# Patient Record
Sex: FEMALE | Race: WHITE | Employment: FULL TIME | URBAN - METROPOLITAN AREA
[De-identification: names, ages, dates, MRNs, and addresses within clinical notes are randomized per-mention and may not be internally consistent; named-entity substitution may affect disease eponyms.]

---

## 2023-04-07 ENCOUNTER — OFFICE VISIT (OUTPATIENT)
Dept: PRIMARY CARE | Facility: CLINIC | Age: 58
End: 2023-04-07
Payer: COMMERCIAL

## 2023-04-07 ENCOUNTER — LAB (OUTPATIENT)
Dept: LAB | Facility: LAB | Age: 58
End: 2023-04-07
Payer: COMMERCIAL

## 2023-04-07 VITALS
WEIGHT: 255 LBS | TEMPERATURE: 96.6 F | HEART RATE: 47 BPM | SYSTOLIC BLOOD PRESSURE: 126 MMHG | HEIGHT: 67 IN | BODY MASS INDEX: 40.02 KG/M2 | DIASTOLIC BLOOD PRESSURE: 74 MMHG | OXYGEN SATURATION: 98 %

## 2023-04-07 DIAGNOSIS — J45.40 MODERATE PERSISTENT REACTIVE AIRWAY DISEASE WITHOUT COMPLICATION (HHS-HCC): ICD-10-CM

## 2023-04-07 DIAGNOSIS — J30.81 ALLERGIC RHINITIS DUE TO ANIMAL HAIR AND DANDER: ICD-10-CM

## 2023-04-07 DIAGNOSIS — J45.990 EXERCISE-INDUCED ASTHMA (HHS-HCC): Primary | ICD-10-CM

## 2023-04-07 DIAGNOSIS — E66.01 MORBID OBESITY WITH BMI OF 40.0-44.9, ADULT (MULTI): ICD-10-CM

## 2023-04-07 DIAGNOSIS — J30.2 ACUTE SEASONAL ALLERGIC RHINITIS: ICD-10-CM

## 2023-04-07 PROBLEM — R29.898 WEAKNESS OF LEFT FOOT: Status: ACTIVE | Noted: 2023-04-07

## 2023-04-07 PROBLEM — M25.572 LEFT ANKLE PAIN: Status: ACTIVE | Noted: 2023-04-07

## 2023-04-07 PROBLEM — G47.30 SLEEP APNEA: Status: ACTIVE | Noted: 2023-04-07

## 2023-04-07 PROBLEM — R92.8 ABNORMAL MAMMOGRAM OF RIGHT BREAST: Status: ACTIVE | Noted: 2023-04-07

## 2023-04-07 PROBLEM — J45.909 REACTIVE AIRWAY DISEASE (HHS-HCC): Status: ACTIVE | Noted: 2023-04-07

## 2023-04-07 PROBLEM — F41.9 ANXIETY DISORDER: Status: ACTIVE | Noted: 2023-04-07

## 2023-04-07 PROBLEM — M25.672 STIFFNESS OF LEFT ANKLE, NOT ELSEWHERE CLASSIFIED: Status: ACTIVE | Noted: 2023-04-07

## 2023-04-07 PROBLEM — L30.9 ECZEMA: Status: ACTIVE | Noted: 2023-04-07

## 2023-04-07 LAB
AMPHETAMINE (PRESENCE) IN URINE BY SCREEN METHOD: NORMAL
BARBITURATES PRESENCE IN URINE BY SCREEN METHOD: NORMAL
BENZODIAZEPINE (PRESENCE) IN URINE BY SCREEN METHOD: NORMAL
CANNABINOIDS IN URINE BY SCREEN METHOD: NORMAL
COCAINE (PRESENCE) IN URINE BY SCREEN METHOD: NORMAL
DRUG SCREEN COMMENT URINE: NORMAL
FENTANYL URINE: NORMAL
METHADONE (PRESENCE) IN URINE BY SCREEN METHOD: NORMAL
OPIATES (PRESENCE) IN URINE BY SCREEN METHOD: NORMAL
OXYCODONE (PRESENCE) IN URINE BY SCREEN METHOD: NORMAL
PHENCYCLIDINE (PRESENCE) IN URINE BY SCREEN METHOD: NORMAL

## 2023-04-07 PROCEDURE — 1036F TOBACCO NON-USER: CPT | Performed by: FAMILY MEDICINE

## 2023-04-07 PROCEDURE — 96372 THER/PROPH/DIAG INJ SC/IM: CPT | Performed by: FAMILY MEDICINE

## 2023-04-07 PROCEDURE — 80307 DRUG TEST PRSMV CHEM ANLYZR: CPT

## 2023-04-07 PROCEDURE — 99213 OFFICE O/P EST LOW 20 MIN: CPT | Performed by: FAMILY MEDICINE

## 2023-04-07 PROCEDURE — 3008F BODY MASS INDEX DOCD: CPT | Performed by: FAMILY MEDICINE

## 2023-04-07 RX ORDER — PHENTERMINE AND TOPIRAMATE 7.5; 46 MG/1; MG/1
1 CAPSULE, EXTENDED RELEASE ORAL DAILY
COMMUNITY
Start: 2022-09-02 | End: 2023-05-04 | Stop reason: ALTCHOICE

## 2023-04-07 RX ORDER — PHENTERMINE HYDROCHLORIDE 37.5 MG/1
37.5 TABLET ORAL DAILY
Qty: 30 TABLET | Refills: 0 | Status: SHIPPED | OUTPATIENT
Start: 2023-04-07 | End: 2023-05-04 | Stop reason: SDUPTHER

## 2023-04-07 RX ORDER — CLOBETASOL PROPIONATE 0.5 MG/G
OINTMENT TOPICAL
COMMUNITY
Start: 2021-04-28 | End: 2024-02-28 | Stop reason: SDUPTHER

## 2023-04-07 RX ORDER — ALBUTEROL SULFATE 90 UG/1
2 AEROSOL, METERED RESPIRATORY (INHALATION)
COMMUNITY
Start: 2021-04-28

## 2023-04-07 RX ORDER — VENLAFAXINE HYDROCHLORIDE 37.5 MG/1
37.5 CAPSULE, EXTENDED RELEASE ORAL DAILY
COMMUNITY
Start: 2018-08-14 | End: 2023-08-29 | Stop reason: SDUPTHER

## 2023-04-07 RX ORDER — TRIAMCINOLONE ACETONIDE 40 MG/ML
60 INJECTION, SUSPENSION INTRA-ARTICULAR; INTRAMUSCULAR ONCE
Status: COMPLETED | OUTPATIENT
Start: 2023-04-07 | End: 2023-04-07

## 2023-04-07 RX ORDER — PHENTERMINE HYDROCHLORIDE 37.5 MG/1
1 TABLET ORAL DAILY
COMMUNITY
Start: 2022-08-08 | End: 2023-04-07 | Stop reason: SDUPTHER

## 2023-04-07 RX ORDER — MONTELUKAST SODIUM 10 MG/1
1 TABLET ORAL DAILY
COMMUNITY
Start: 2018-10-30 | End: 2023-05-04 | Stop reason: SDUPTHER

## 2023-04-07 RX ADMIN — TRIAMCINOLONE ACETONIDE 60 MG: 40 INJECTION, SUSPENSION INTRA-ARTICULAR; INTRAMUSCULAR at 10:18

## 2023-04-07 ASSESSMENT — PAIN SCALES - GENERAL: PAINLEVEL: 0-NO PAIN

## 2023-04-07 NOTE — PROGRESS NOTES
Subjective   Patient ID: Elsie Renteria is a 57 y.o. female.    Patient here for chronic rhinitis. Would like kenalog shot. Has tried all OTC with little relief. Also would like to get back on phentermine. Has worked well in past. Extremely physically active. No problems with stimulants.    OARRS:  Renee Velásquez MD on 4/7/2023 10:00 AM  I have personally reviewed the OARRS report for Elsie Renteria. I have considered the risks of abuse, dependence, addiction and diversion and I believe that it is clinically appropriate for Elsie Renteria to be prescribed this medication    Is the patient prescribed a combination of a benzodiazepine and opioid?  No    Last Urine Drug Screen / ordered today: Yes  Recent Results (from the past 32598 hour(s))   Drug Screen, Urine With Reflex to Confirmation    Collection Time: 04/07/23 10:27 AM   Result Value Ref Range    DRUG SCREEN COMMENT URINE SEE BELOW     Amphetamine Screen, Urine PRESUMPTIVE NEGATIVE NEGATIVE    Barbiturate Screen, Urine PRESUMPTIVE NEGATIVE NEGATIVE    BENZODIAZEPINE (PRESENCE) IN URINE BY SCREEN METHOD PRESUMPTIVE NEGATIVE NEGATIVE    Cannabinoid Screen, Urine PRESUMPTIVE NEGATIVE NEGATIVE    Cocaine Screen, Urine PRESUMPTIVE NEGATIVE NEGATIVE    Fentanyl, Ur PRESUMPTIVE NEGATIVE NEGATIVE    Methadone Screen, Urine PRESUMPTIVE NEGATIVE NEGATIVE    Opiate Screen, Urine PRESUMPTIVE NEGATIVE NEGATIVE    Oxycodone Screen, Ur PRESUMPTIVE NEGATIVE NEGATIVE    PCP Screen, Urine PRESUMPTIVE NEGATIVE NEGATIVE   OPIATE/OPIOID/BENZO PRESCRIPTION COMPLIANCE    Collection Time: 04/28/22  9:15 AM   Result Value Ref Range    DRUG SCREEN COMMENT URINE SEE BELOW     Creatine, Urine 179.0 mg/dL    Amphetamine Screen, Urine PRESUMPTIVE NEGATIVE NEGATIVE    Barbiturate Screen, Urine PRESUMPTIVE NEGATIVE NEGATIVE    Cannabinoid Screen, Urine PRESUMPTIVE NEGATIVE NEGATIVE    Cocaine Screen, Urine PRESUMPTIVE NEGATIVE NEGATIVE    PCP Screen, Urine PRESUMPTIVE NEGATIVE NEGATIVE     7-Aminoclonazepam <25 Cutoff <25 ng/mL    Alpha-Hydroxyalprazolam <25 Cutoff <25 ng/mL    Alpha-Hydroxymidazolam <25 Cutoff <25 ng/mL    Alprazolam <25 Cutoff <25 ng/mL    Chlordiazepoxide <25 Cutoff <25 ng/mL    Clonazepam <25 Cutoff <25 ng/mL    Diazepam <25 Cutoff <25 ng/mL    Lorazepam <25 Cutoff <25 ng/mL    Midazolam <25 Cutoff <25 ng/mL    Nordiazepam <25 Cutoff <25 ng/mL    Oxazepam <25 Cutoff <25 ng/mL    Temazepam <25 Cutoff <25 ng/mL    Zolpidem <25 Cutoff <25 ng/mL    Zolpidem Metabolite (ZCA) <25 Cutoff <25 ng/mL    6-Acetylmorphine <25 Cutoff <25 ng/mL    Codeine <50 Cutoff <50 ng/mL    Hydrocodone <25 Cutoff <25 ng/mL    Hydromorphone <25 Cutoff <25 ng/mL    Morphine Urine <50 Cutoff <50 ng/mL    Norhydrocodone <25 Cutoff <25 ng/mL    Noroxycodone <25 Cutoff <25 ng/mL    Oxycodone <25 Cutoff <25 ng/mL    Oxymorphone <25 Cutoff <25 ng/mL    Tramadol <50 Cutoff <50 ng/mL    O-Desmethyltramadol <50 Cutoff <50 ng/mL    Fentanyl <2.5 Cutoff<2.5 ng/mL    Norfentanyl <2.5 Cutoff<2.5 ng/mL    METHADONE CONFIRMATION,URINE <25 Cutoff <25 ng/mL    EDDP <25 Cutoff <25 ng/mL     Results are as expected.     Controlled Substance Agreement:  Date of the Last Agreement: today  Reviewed Controlled Substance Agreement including but not limited to the benefits, risks, and alternatives to treatment with a Controlled Substance medication(s).    Anorexiants:   What is the patient's goal of therapy? Lose weight  Is this being achieved with current treatment? yes    I have assessed the patient's continuing efforts to lose weight., I have assessed the patient's dedication to the treatment program and the response to treatment., and I have assessed the presence or absence of contraindications, adverse effects, and indicators of possible substance abuse that would necessitate cessation of treatment utilizing controlled substance.    Patient has demonstrated continued efforts to lose weight, is dedicated to the treatment program  and the response to treatment. and I have assessed for the presence or absence of contraindications, adverse effects, and indicators of possible substance abuse that would necessitate cessation of treatment utilizing controlled substance.    Activities of Daily Living:   Is your overall impression that this patient is benefiting (symptom reduction outweighs side effects) from anorexiants therapy? Yes     1. Physical Functioning: Better  2. Family Relationship: Same  3. Social Relationship: Same  4. Mood: Same  5. Sleep Patterns: Same  6. Overall Function: Same      Review of Systems   Constitutional:  Negative for fatigue, fever and unexpected weight change.   HENT:  Positive for congestion and rhinorrhea. Negative for ear pain, hearing loss, sore throat and trouble swallowing.    Eyes:  Negative for pain and visual disturbance.   Respiratory:  Negative for cough and shortness of breath.    Cardiovascular:  Negative for chest pain, palpitations and leg swelling.   Gastrointestinal:  Negative for abdominal pain, blood in stool, diarrhea, nausea and vomiting.   Genitourinary:  Negative for dysuria, frequency, hematuria and urgency.   Musculoskeletal:  Negative for joint swelling.   Skin:  Negative for pallor and rash.   Neurological:  Negative for dizziness, syncope, weakness, numbness and headaches.   Psychiatric/Behavioral:  Negative for confusion, decreased concentration, hallucinations and suicidal ideas.      Vitals:    04/07/23 0920   BP: 126/74   Pulse: (!) 47   Temp: 35.9 °C (96.6 °F)   SpO2: 98%      Objective   Physical Exam  Constitutional:       Appearance: Normal appearance.   HENT:      Nose: Congestion and rhinorrhea present.      Mouth/Throat:      Mouth: Mucous membranes are moist.      Pharynx: Posterior oropharyngeal erythema present.   Cardiovascular:      Rate and Rhythm: Normal rate and regular rhythm.      Heart sounds: Normal heart sounds.   Pulmonary:      Effort: Pulmonary effort is normal.       Breath sounds: Normal breath sounds.   Musculoskeletal:      Cervical back: Neck supple.   Skin:     General: Skin is warm and dry.   Neurological:      General: No focal deficit present.      Mental Status: She is alert.   Psychiatric:         Mood and Affect: Mood normal.         Speech: Speech normal.         Behavior: Behavior normal.         Cognition and Memory: Cognition normal.         Assessment/Plan   Diagnoses and all orders for this visit:  Exercise-induced asthma  Moderate persistent reactive airway disease without complication  Morbid obesity with BMI of 40.0-44.9, adult (CMS/[facility])  -     Drug Screen, Urine With Reflex to Confirmation; Future  -     phentermine (Adipex-P) 37.5 mg tablet; Take 1 tablet (37.5 mg) by mouth once daily.  Acute seasonal allergic rhinitis  -     triamcinolone acetonide (Kenalog-40) injection 64 mg  Allergic rhinitis due to animal hair and dander

## 2023-04-12 PROBLEM — J30.81 ALLERGIC RHINITIS DUE TO ANIMAL HAIR AND DANDER: Status: ACTIVE | Noted: 2017-07-14

## 2023-04-22 ASSESSMENT — ENCOUNTER SYMPTOMS
PALPITATIONS: 0
WEAKNESS: 0
BLOOD IN STOOL: 0
RHINORRHEA: 1
DYSURIA: 0
HALLUCINATIONS: 0
NAUSEA: 0
JOINT SWELLING: 0
UNEXPECTED WEIGHT CHANGE: 0
EYE PAIN: 0
DIARRHEA: 0
FEVER: 0
ABDOMINAL PAIN: 0
HEADACHES: 0
DIZZINESS: 0
FATIGUE: 0
DECREASED CONCENTRATION: 0
SHORTNESS OF BREATH: 0
CONFUSION: 0
TROUBLE SWALLOWING: 0
HEMATURIA: 0
COUGH: 0
FREQUENCY: 0
NUMBNESS: 0
SORE THROAT: 0
VOMITING: 0

## 2023-04-22 NOTE — PATIENT INSTRUCTIONS
It was nice to see you today!  Will give kenalog 60mg daily  Phentermine restarted, follow up 1 month  Discussed current concerns and addressed   Reviewed recent labs and diagnostics  Reviewed medications list  Continue to eat a healthy diet, exercise at least 3 times a week or more  Plan and follow up discussed

## 2023-05-04 ENCOUNTER — OFFICE VISIT (OUTPATIENT)
Dept: PRIMARY CARE | Facility: CLINIC | Age: 58
End: 2023-05-04
Payer: COMMERCIAL

## 2023-05-04 VITALS
HEART RATE: 79 BPM | BODY MASS INDEX: 38.45 KG/M2 | DIASTOLIC BLOOD PRESSURE: 76 MMHG | OXYGEN SATURATION: 99 % | HEIGHT: 67 IN | WEIGHT: 245 LBS | TEMPERATURE: 96.3 F | SYSTOLIC BLOOD PRESSURE: 126 MMHG

## 2023-05-04 DIAGNOSIS — E66.1 CLASS 2 DRUG-INDUCED OBESITY WITHOUT SERIOUS COMORBIDITY WITH BODY MASS INDEX (BMI) OF 38.0 TO 38.9 IN ADULT: ICD-10-CM

## 2023-05-04 DIAGNOSIS — E66.01 MORBID OBESITY WITH BMI OF 40.0-44.9, ADULT (MULTI): ICD-10-CM

## 2023-05-04 DIAGNOSIS — J45.990 EXERCISE-INDUCED ASTHMA (HHS-HCC): Primary | ICD-10-CM

## 2023-05-04 PROCEDURE — 1036F TOBACCO NON-USER: CPT | Performed by: FAMILY MEDICINE

## 2023-05-04 PROCEDURE — 99213 OFFICE O/P EST LOW 20 MIN: CPT | Performed by: FAMILY MEDICINE

## 2023-05-04 PROCEDURE — 3008F BODY MASS INDEX DOCD: CPT | Performed by: FAMILY MEDICINE

## 2023-05-04 RX ORDER — MONTELUKAST SODIUM 10 MG/1
10 TABLET ORAL DAILY
Qty: 90 TABLET | Refills: 3 | Status: SHIPPED | OUTPATIENT
Start: 2023-05-04 | End: 2023-05-24 | Stop reason: SDUPTHER

## 2023-05-04 RX ORDER — PHENTERMINE HYDROCHLORIDE 37.5 MG/1
37.5 TABLET ORAL DAILY
Qty: 30 TABLET | Refills: 0 | Status: SHIPPED | OUTPATIENT
Start: 2023-05-04 | End: 2023-06-01 | Stop reason: SDUPTHER

## 2023-05-04 ASSESSMENT — ENCOUNTER SYMPTOMS
NAUSEA: 0
ABDOMINAL PAIN: 0
SORE THROAT: 0
NUMBNESS: 0
HALLUCINATIONS: 0
TROUBLE SWALLOWING: 0
DIARRHEA: 0
DIZZINESS: 0
FATIGUE: 0
SHORTNESS OF BREATH: 0
UNEXPECTED WEIGHT CHANGE: 0
HEMATURIA: 0
FEVER: 0
PALPITATIONS: 0
DYSURIA: 0
HEADACHES: 0
WEAKNESS: 0
VOMITING: 0
COUGH: 0
BLOOD IN STOOL: 0
FREQUENCY: 0
DECREASED CONCENTRATION: 0
EYE PAIN: 0
JOINT SWELLING: 0
CONFUSION: 0

## 2023-05-04 ASSESSMENT — PAIN SCALES - GENERAL: PAINLEVEL: 0-NO PAIN

## 2023-05-04 NOTE — PROGRESS NOTES
OARRS:  No data recorded  I have personally reviewed the OARRS report for Elsie Renteria. I have considered the risks of abuse, dependence, addiction and diversion and I believe that it is clinically appropriate for Elsie Renteria to be prescribed this medication    Is the patient prescribed a combination of a benzodiazepine and opioid?  No    Last Urine Drug Screen / ordered today: Yes  Recent Results (from the past 45079 hour(s))   Drug Screen, Urine With Reflex to Confirmation    Collection Time: 04/07/23 10:27 AM   Result Value Ref Range    DRUG SCREEN COMMENT URINE SEE BELOW     Amphetamine Screen, Urine PRESUMPTIVE NEGATIVE NEGATIVE    Barbiturate Screen, Urine PRESUMPTIVE NEGATIVE NEGATIVE    BENZODIAZEPINE (PRESENCE) IN URINE BY SCREEN METHOD PRESUMPTIVE NEGATIVE NEGATIVE    Cannabinoid Screen, Urine PRESUMPTIVE NEGATIVE NEGATIVE    Cocaine Screen, Urine PRESUMPTIVE NEGATIVE NEGATIVE    Fentanyl, Ur PRESUMPTIVE NEGATIVE NEGATIVE    Methadone Screen, Urine PRESUMPTIVE NEGATIVE NEGATIVE    Opiate Screen, Urine PRESUMPTIVE NEGATIVE NEGATIVE    Oxycodone Screen, Ur PRESUMPTIVE NEGATIVE NEGATIVE    PCP Screen, Urine PRESUMPTIVE NEGATIVE NEGATIVE   OPIATE/OPIOID/BENZO PRESCRIPTION COMPLIANCE    Collection Time: 04/28/22  9:15 AM   Result Value Ref Range    DRUG SCREEN COMMENT URINE SEE BELOW     Creatine, Urine 179.0 mg/dL    Amphetamine Screen, Urine PRESUMPTIVE NEGATIVE NEGATIVE    Barbiturate Screen, Urine PRESUMPTIVE NEGATIVE NEGATIVE    Cannabinoid Screen, Urine PRESUMPTIVE NEGATIVE NEGATIVE    Cocaine Screen, Urine PRESUMPTIVE NEGATIVE NEGATIVE    PCP Screen, Urine PRESUMPTIVE NEGATIVE NEGATIVE    7-Aminoclonazepam <25 Cutoff <25 ng/mL    Alpha-Hydroxyalprazolam <25 Cutoff <25 ng/mL    Alpha-Hydroxymidazolam <25 Cutoff <25 ng/mL    Alprazolam <25 Cutoff <25 ng/mL    Chlordiazepoxide <25 Cutoff <25 ng/mL    Clonazepam <25 Cutoff <25 ng/mL    Diazepam <25 Cutoff <25 ng/mL    Lorazepam <25 Cutoff <25 ng/mL    Midazolam  <25 Cutoff <25 ng/mL    Nordiazepam <25 Cutoff <25 ng/mL    Oxazepam <25 Cutoff <25 ng/mL    Temazepam <25 Cutoff <25 ng/mL    Zolpidem <25 Cutoff <25 ng/mL    Zolpidem Metabolite (ZCA) <25 Cutoff <25 ng/mL    6-Acetylmorphine <25 Cutoff <25 ng/mL    Codeine <50 Cutoff <50 ng/mL    Hydrocodone <25 Cutoff <25 ng/mL    Hydromorphone <25 Cutoff <25 ng/mL    Morphine Urine <50 Cutoff <50 ng/mL    Norhydrocodone <25 Cutoff <25 ng/mL    Noroxycodone <25 Cutoff <25 ng/mL    Oxycodone <25 Cutoff <25 ng/mL    Oxymorphone <25 Cutoff <25 ng/mL    Tramadol <50 Cutoff <50 ng/mL    O-Desmethyltramadol <50 Cutoff <50 ng/mL    Fentanyl <2.5 Cutoff<2.5 ng/mL    Norfentanyl <2.5 Cutoff<2.5 ng/mL    METHADONE CONFIRMATION,URINE <25 Cutoff <25 ng/mL    EDDP <25 Cutoff <25 ng/mL     Results are as expected.     Controlled Substance Agreement:  Date of the Last Agreement: current  Reviewed Controlled Substance Agreement including but not limited to the benefits, risks, and alternatives to treatment with a Controlled Substance medication(s).    Anorexiants:   What is the patient's goal of therapy? Lose weight  Is this being achieved with current treatment? yes    I have assessed the patient's continuing efforts to lose weight., I have assessed the patient's dedication to the treatment program and the response to treatment., and I have assessed the presence or absence of contraindications, adverse effects, and indicators of possible substance abuse that would necessitate cessation of treatment utilizing controlled substance.    Patient has demonstrated continued efforts to lose weight, is dedicated to the treatment program and the response to treatment. and I have assessed for the presence or absence of contraindications, adverse effects, and indicators of possible substance abuse that would necessitate cessation of treatment utilizing controlled substance.    Activities of Daily Living:   Is your overall impression that this patient is  benefiting (symptom reduction outweighs side effects) from anorexiants therapy? Yes     1. Physical Functioning: Same  2. Family Relationship: Same  3. Social Relationship: Same  4. Mood: Same  5. Sleep Patterns: Same  6. Overall Function: Same  Subjective   Patient ID: Elsie Renteria is a 57 y.o. female.    RF phentermine. Lost 11 lbs.        Review of Systems   Constitutional:  Negative for fatigue, fever and unexpected weight change.   HENT:  Negative for congestion, ear pain, hearing loss, sore throat and trouble swallowing.    Eyes:  Negative for pain and visual disturbance.   Respiratory:  Negative for cough and shortness of breath.    Cardiovascular:  Negative for chest pain, palpitations and leg swelling.   Gastrointestinal:  Negative for abdominal pain, blood in stool, diarrhea, nausea and vomiting.   Genitourinary:  Negative for dysuria, frequency, hematuria and urgency.   Musculoskeletal:  Negative for joint swelling.   Skin:  Negative for pallor and rash.   Neurological:  Negative for dizziness, syncope, weakness, numbness and headaches.   Psychiatric/Behavioral:  Negative for confusion, decreased concentration, hallucinations and suicidal ideas.      Vitals:    05/04/23 0923   BP: 126/76   Pulse: 79   Temp: 35.7 °C (96.3 °F)   SpO2: 99%      Objective   Physical Exam  Constitutional:       Appearance: Normal appearance. She is obese.   Cardiovascular:      Rate and Rhythm: Normal rate and regular rhythm.      Heart sounds: Normal heart sounds.   Pulmonary:      Effort: Pulmonary effort is normal.      Breath sounds: Normal breath sounds.   Musculoskeletal:      Cervical back: Neck supple.   Skin:     General: Skin is warm and dry.   Neurological:      General: No focal deficit present.      Mental Status: She is alert.   Psychiatric:         Mood and Affect: Mood normal.         Speech: Speech normal.         Behavior: Behavior normal.         Cognition and Memory: Cognition normal.         Assessment/Plan    Diagnoses and all orders for this visit:  Exercise-induced asthma  -     montelukast (Singulair) 10 mg tablet; Take 1 tablet (10 mg) by mouth once daily.  Morbid obesity with BMI of 40.0-44.9, adult (CMS/[facility])  -     phentermine (Adipex-P) 37.5 mg tablet; Take 1 tablet (37.5 mg) by mouth once daily.  Class 2 drug-induced obesity without serious comorbidity with body mass index (BMI) of 38.0 to 38.9 in adult

## 2023-05-04 NOTE — PATIENT INSTRUCTIONS
RF phentermine  Risk and benefit discussed  OARRS reviewed  CSA and UDS current/updated  Follow up 1 month

## 2023-05-05 ENCOUNTER — APPOINTMENT (OUTPATIENT)
Dept: PRIMARY CARE | Facility: CLINIC | Age: 58
End: 2023-05-05
Payer: COMMERCIAL

## 2023-05-24 DIAGNOSIS — J45.990 EXERCISE-INDUCED ASTHMA (HHS-HCC): ICD-10-CM

## 2023-05-30 RX ORDER — MONTELUKAST SODIUM 10 MG/1
10 TABLET ORAL DAILY
Qty: 90 TABLET | Refills: 3 | Status: SHIPPED | OUTPATIENT
Start: 2023-05-30 | End: 2024-05-02

## 2023-06-01 ENCOUNTER — TELEMEDICINE (OUTPATIENT)
Dept: PRIMARY CARE | Facility: CLINIC | Age: 58
End: 2023-06-01
Payer: COMMERCIAL

## 2023-06-01 DIAGNOSIS — E66.01 MORBID OBESITY WITH BMI OF 40.0-44.9, ADULT (MULTI): ICD-10-CM

## 2023-06-01 PROCEDURE — 99213 OFFICE O/P EST LOW 20 MIN: CPT | Performed by: FAMILY MEDICINE

## 2023-06-01 RX ORDER — PHENTERMINE HYDROCHLORIDE 37.5 MG/1
37.5 TABLET ORAL DAILY
Qty: 30 TABLET | Refills: 0 | Status: SHIPPED | OUTPATIENT
Start: 2023-06-05 | End: 2023-06-13

## 2023-06-01 ASSESSMENT — ENCOUNTER SYMPTOMS
PALPITATIONS: 0
HEMATURIA: 0
NUMBNESS: 0
WEAKNESS: 0
DECREASED CONCENTRATION: 0
FATIGUE: 0
TROUBLE SWALLOWING: 0
JOINT SWELLING: 0
FEVER: 0
DIZZINESS: 0
SORE THROAT: 0
EYE PAIN: 0
CONFUSION: 0
DYSURIA: 0
HALLUCINATIONS: 0
DIARRHEA: 0
HEADACHES: 0
VOMITING: 0
SHORTNESS OF BREATH: 0
BLOOD IN STOOL: 0
ABDOMINAL PAIN: 0
NAUSEA: 0
COUGH: 0
UNEXPECTED WEIGHT CHANGE: 0
FREQUENCY: 0

## 2023-06-01 NOTE — PROGRESS NOTES
Subjective   Patient ID: Elsie Renteria is a 57 y.o. female.    Patient here to refill phentermine. She has lost weight. About 5 lbs. She is out of town to do a long cycle event.  Understands the medication, risk and benefits. No uncontrolled BP. No hx stroke, MI. Is following a healthy diet with a caloric goal below 1200 daily. She is committed to exercising at least 3x a week for at least 40 minutes. She does not smoke. She has no CP, SOB or palpitations.          Review of Systems   Constitutional:  Negative for fatigue, fever and unexpected weight change.   HENT:  Negative for congestion, ear pain, hearing loss, sore throat and trouble swallowing.    Eyes:  Negative for pain and visual disturbance.   Respiratory:  Negative for cough and shortness of breath.    Cardiovascular:  Negative for chest pain, palpitations and leg swelling.   Gastrointestinal:  Negative for abdominal pain, blood in stool, diarrhea, nausea and vomiting.   Genitourinary:  Negative for dysuria, frequency, hematuria and urgency.   Musculoskeletal:  Negative for joint swelling.   Skin:  Negative for pallor and rash.   Neurological:  Negative for dizziness, syncope, weakness, numbness and headaches.   Psychiatric/Behavioral:  Negative for confusion, decreased concentration, hallucinations and suicidal ideas.      There were no vitals filed for this visit.   Objective   Physical Exam  Constitutional:       Appearance: Normal appearance. She is obese.   Cardiovascular:      Rate and Rhythm: Normal rate and regular rhythm.      Heart sounds: Normal heart sounds.   Pulmonary:      Effort: Pulmonary effort is normal.      Breath sounds: Normal breath sounds.   Musculoskeletal:      Cervical back: Neck supple.   Skin:     General: Skin is warm and dry.   Neurological:      General: No focal deficit present.      Mental Status: She is alert.   Psychiatric:         Mood and Affect: Mood normal.         Speech: Speech normal.         Behavior: Behavior  normal.         Cognition and Memory: Cognition normal.         Assessment/Plan   Diagnoses and all orders for this visit:  Morbid obesity with BMI of 40.0-44.9, adult (CMS/[facility])  -     phentermine (Adipex-P) 37.5 mg tablet; Take 1 tablet (37.5 mg) by mouth once daily. Do not start before June 5, 2023.

## 2023-06-06 DIAGNOSIS — E66.01 MORBID OBESITY WITH BMI OF 40.0-44.9, ADULT (MULTI): ICD-10-CM

## 2023-06-06 NOTE — TELEPHONE ENCOUNTER
Patient was seen for virtual visit and phentermine was sent to pharmacy in [place] but they did not have the medication in stock and told the patient that they were not sure that they could get it in stock. Patient found another pharmacy which was added to her chart.

## 2023-06-07 RX ORDER — PHENTERMINE HYDROCHLORIDE 37.5 MG/1
37.5 TABLET ORAL DAILY
Qty: 30 TABLET | Refills: 0 | Status: CANCELLED | OUTPATIENT
Start: 2023-06-07

## 2023-06-09 NOTE — TELEPHONE ENCOUNTER
Patient called patient and would like the phentermine to be [facility] to [facility] on [address]. Patient is home from traveling

## 2023-06-11 DIAGNOSIS — E66.01 MORBID OBESITY WITH BMI OF 40.0-44.9, ADULT (MULTI): ICD-10-CM

## 2023-06-13 RX ORDER — PHENTERMINE HYDROCHLORIDE 37.5 MG/1
TABLET ORAL
Qty: 30 TABLET | Refills: 0 | Status: SHIPPED | OUTPATIENT
Start: 2023-06-13 | End: 2023-07-18 | Stop reason: SDUPTHER

## 2023-07-18 ENCOUNTER — OFFICE VISIT (OUTPATIENT)
Dept: PRIMARY CARE | Facility: CLINIC | Age: 58
End: 2023-07-18
Payer: COMMERCIAL

## 2023-07-18 VITALS
TEMPERATURE: 97.3 F | HEIGHT: 67 IN | SYSTOLIC BLOOD PRESSURE: 120 MMHG | BODY MASS INDEX: 37.51 KG/M2 | DIASTOLIC BLOOD PRESSURE: 70 MMHG | OXYGEN SATURATION: 100 % | HEART RATE: 60 BPM | WEIGHT: 239 LBS

## 2023-07-18 PROCEDURE — 99213 OFFICE O/P EST LOW 20 MIN: CPT | Performed by: FAMILY MEDICINE

## 2023-07-18 PROCEDURE — 3008F BODY MASS INDEX DOCD: CPT | Performed by: FAMILY MEDICINE

## 2023-07-18 PROCEDURE — 1036F TOBACCO NON-USER: CPT | Performed by: FAMILY MEDICINE

## 2023-07-18 RX ORDER — PHENTERMINE HYDROCHLORIDE 37.5 MG/1
37.5 TABLET ORAL DAILY
Qty: 30 TABLET | Refills: 0 | Status: SHIPPED | OUTPATIENT
Start: 2023-07-18 | End: 2023-08-21 | Stop reason: SDUPTHER

## 2023-07-18 ASSESSMENT — PAIN SCALES - GENERAL: PAINLEVEL: 0-NO PAIN

## 2023-07-18 NOTE — PROGRESS NOTES
Subjective   Patient ID: Elsie Renteria is a 57 y.o. female.    Patient here to refill phentermine. She has lost weight. Understands the medication, risk and benefits. No uncontrolled BP. No hx stroke, MI. Is following a healthy diet with a caloric goal below 1200 daily. She is committed to exercising at least 3x a week for at least 40 minutes. She does not smoke. She has no CP, SOB or palpitations.          Review of Systems   Constitutional:  Negative for fatigue, fever and unexpected weight change.   HENT:  Negative for congestion, ear pain, hearing loss, sore throat and trouble swallowing.    Eyes:  Negative for pain and visual disturbance.   Respiratory:  Negative for cough and shortness of breath.    Cardiovascular:  Negative for chest pain, palpitations and leg swelling.   Gastrointestinal:  Negative for abdominal pain, blood in stool, diarrhea, nausea and vomiting.   Genitourinary:  Negative for dysuria, frequency, hematuria and urgency.   Musculoskeletal:  Negative for joint swelling.   Skin:  Negative for pallor and rash.   Neurological:  Negative for dizziness, syncope, weakness, numbness and headaches.   Psychiatric/Behavioral:  Negative for confusion, decreased concentration, hallucinations and suicidal ideas.      Vitals:    07/18/23 1558   BP: 120/70   Pulse: 60   Temp: 36.3 °C (97.3 °F)   SpO2: 100%      Objective   Physical Exam  Constitutional:       Appearance: Normal appearance. She is obese.   Cardiovascular:      Rate and Rhythm: Normal rate and regular rhythm.      Heart sounds: Normal heart sounds.   Pulmonary:      Effort: Pulmonary effort is normal.      Breath sounds: Normal breath sounds.   Musculoskeletal:      Cervical back: Neck supple.   Skin:     General: Skin is warm and dry.   Neurological:      General: No focal deficit present.      Mental Status: She is alert.   Psychiatric:         Mood and Affect: Mood normal.         Speech: Speech normal.         Behavior: Behavior normal.          Cognition and Memory: Cognition normal.     OARRS:  No data recorded  I have personally reviewed the OARRS report for Elsie Renteria. I have considered the risks of abuse, dependence, addiction and diversion and I believe that it is clinically appropriate for Elsie Renteria to be prescribed this medication    Is the patient prescribed a combination of a benzodiazepine and opioid?  No    Last Urine Drug Screen / ordered today: Yes  Recent Results (from the past 86471 hour(s))   Drug Screen, Urine With Reflex to Confirmation    Collection Time: 04/07/23 10:27 AM   Result Value Ref Range    DRUG SCREEN COMMENT URINE SEE BELOW     Amphetamine Screen, Urine PRESUMPTIVE NEGATIVE NEGATIVE    Barbiturate Screen, Urine PRESUMPTIVE NEGATIVE NEGATIVE    BENZODIAZEPINE (PRESENCE) IN URINE BY SCREEN METHOD PRESUMPTIVE NEGATIVE NEGATIVE    Cannabinoid Screen, Urine PRESUMPTIVE NEGATIVE NEGATIVE    Cocaine Screen, Urine PRESUMPTIVE NEGATIVE NEGATIVE    Fentanyl, Ur PRESUMPTIVE NEGATIVE NEGATIVE    Methadone Screen, Urine PRESUMPTIVE NEGATIVE NEGATIVE    Opiate Screen, Urine PRESUMPTIVE NEGATIVE NEGATIVE    Oxycodone Screen, Ur PRESUMPTIVE NEGATIVE NEGATIVE    PCP Screen, Urine PRESUMPTIVE NEGATIVE NEGATIVE   OPIATE/OPIOID/BENZO PRESCRIPTION COMPLIANCE    Collection Time: 04/28/22  9:15 AM   Result Value Ref Range    DRUG SCREEN COMMENT URINE SEE BELOW     Creatine, Urine 179.0 mg/dL    Amphetamine Screen, Urine PRESUMPTIVE NEGATIVE NEGATIVE    Barbiturate Screen, Urine PRESUMPTIVE NEGATIVE NEGATIVE    Cannabinoid Screen, Urine PRESUMPTIVE NEGATIVE NEGATIVE    Cocaine Screen, Urine PRESUMPTIVE NEGATIVE NEGATIVE    PCP Screen, Urine PRESUMPTIVE NEGATIVE NEGATIVE    7-Aminoclonazepam <25 Cutoff <25 ng/mL    Alpha-Hydroxyalprazolam <25 Cutoff <25 ng/mL    Alpha-Hydroxymidazolam <25 Cutoff <25 ng/mL    Alprazolam <25 Cutoff <25 ng/mL    Chlordiazepoxide <25 Cutoff <25 ng/mL    Clonazepam <25 Cutoff <25 ng/mL    Diazepam <25 Cutoff <25 ng/mL     Lorazepam <25 Cutoff <25 ng/mL    Midazolam <25 Cutoff <25 ng/mL    Nordiazepam <25 Cutoff <25 ng/mL    Oxazepam <25 Cutoff <25 ng/mL    Temazepam <25 Cutoff <25 ng/mL    Zolpidem <25 Cutoff <25 ng/mL    Zolpidem Metabolite (ZCA) <25 Cutoff <25 ng/mL    6-Acetylmorphine <25 Cutoff <25 ng/mL    Codeine <50 Cutoff <50 ng/mL    Hydrocodone <25 Cutoff <25 ng/mL    Hydromorphone <25 Cutoff <25 ng/mL    Morphine Urine <50 Cutoff <50 ng/mL    Norhydrocodone <25 Cutoff <25 ng/mL    Noroxycodone <25 Cutoff <25 ng/mL    Oxycodone <25 Cutoff <25 ng/mL    Oxymorphone <25 Cutoff <25 ng/mL    Tramadol <50 Cutoff <50 ng/mL    O-Desmethyltramadol <50 Cutoff <50 ng/mL    Fentanyl <2.5 Cutoff<2.5 ng/mL    Norfentanyl <2.5 Cutoff<2.5 ng/mL    METHADONE CONFIRMATION,URINE <25 Cutoff <25 ng/mL    EDDP <25 Cutoff <25 ng/mL     Results are as expected.     Controlled Substance Agreement:  Date of the Last Agreement: 4/7/23  Reviewed Controlled Substance Agreement including but not limited to the benefits, risks, and alternatives to treatment with a Controlled Substance medication(s).    Anorexiants:   What is the patient's goal of therapy? Lose weight  Is this being achieved with current treatment? yes    I have assessed the patient's continuing efforts to lose weight., I have assessed the patient's dedication to the treatment program and the response to treatment., and I have assessed the presence or absence of contraindications, adverse effects, and indicators of possible substance abuse that would necessitate cessation of treatment utilizing controlled substance.    Patient has demonstrated continued efforts to lose weight, is dedicated to the treatment program and the response to treatment. and I have assessed for the presence or absence of contraindications, adverse effects, and indicators of possible substance abuse that would necessitate cessation of treatment utilizing controlled substance.    Activities of Daily Living:   Is  your overall impression that this patient is benefiting (symptom reduction outweighs side effects) from anorexiants therapy? Yes     1. Physical Functioning: Better  2. Family Relationship: Same  3. Social Relationship: Same  4. Mood: Same  5. Sleep Patterns: Same  6. Overall Function: Better      Assessment/Plan   There are no diagnoses linked to this encounter.

## 2023-08-02 ASSESSMENT — ENCOUNTER SYMPTOMS
DYSURIA: 0
CONFUSION: 0
DIZZINESS: 0
UNEXPECTED WEIGHT CHANGE: 0
ABDOMINAL PAIN: 0
HALLUCINATIONS: 0
WEAKNESS: 0
SORE THROAT: 0
NUMBNESS: 0
JOINT SWELLING: 0
DIARRHEA: 0
EYE PAIN: 0
BLOOD IN STOOL: 0
PALPITATIONS: 0
HEADACHES: 0
DECREASED CONCENTRATION: 0
HEMATURIA: 0
COUGH: 0
FATIGUE: 0
VOMITING: 0
NAUSEA: 0
FREQUENCY: 0
FEVER: 0
SHORTNESS OF BREATH: 0
TROUBLE SWALLOWING: 0

## 2023-08-21 ENCOUNTER — OFFICE VISIT (OUTPATIENT)
Dept: PRIMARY CARE | Facility: CLINIC | Age: 58
End: 2023-08-21
Payer: COMMERCIAL

## 2023-08-21 VITALS
OXYGEN SATURATION: 98 % | DIASTOLIC BLOOD PRESSURE: 84 MMHG | SYSTOLIC BLOOD PRESSURE: 126 MMHG | TEMPERATURE: 97.3 F | HEIGHT: 67 IN | BODY MASS INDEX: 37.2 KG/M2 | WEIGHT: 237 LBS | HEART RATE: 55 BPM

## 2023-08-21 PROCEDURE — 99213 OFFICE O/P EST LOW 20 MIN: CPT | Performed by: FAMILY MEDICINE

## 2023-08-21 PROCEDURE — 3008F BODY MASS INDEX DOCD: CPT | Performed by: FAMILY MEDICINE

## 2023-08-21 PROCEDURE — 1036F TOBACCO NON-USER: CPT | Performed by: FAMILY MEDICINE

## 2023-08-21 RX ORDER — PHENTERMINE HYDROCHLORIDE 37.5 MG/1
37.5 TABLET ORAL DAILY
Qty: 30 TABLET | Refills: 0 | Status: SHIPPED | OUTPATIENT
Start: 2023-08-21 | End: 2023-09-15 | Stop reason: SDUPTHER

## 2023-08-21 RX ORDER — PHENTERMINE HYDROCHLORIDE 37.5 MG/1
37.5 TABLET ORAL DAILY
Qty: 30 TABLET | Refills: 0 | Status: SHIPPED
Start: 2023-08-21 | End: 2023-08-21 | Stop reason: SDUPTHER

## 2023-08-21 ASSESSMENT — ENCOUNTER SYMPTOMS
SHORTNESS OF BREATH: 0
FEVER: 0
FATIGUE: 0
NAUSEA: 0
TROUBLE SWALLOWING: 0
ABDOMINAL PAIN: 0
WEAKNESS: 0
NUMBNESS: 0
PALPITATIONS: 0
DECREASED CONCENTRATION: 0
DYSURIA: 0
VOMITING: 0
DIARRHEA: 0
JOINT SWELLING: 0
HEMATURIA: 0
HEADACHES: 0
BLOOD IN STOOL: 0
CONFUSION: 0
DIZZINESS: 0
EYE PAIN: 0
UNEXPECTED WEIGHT CHANGE: 0
HALLUCINATIONS: 0
FREQUENCY: 0
COUGH: 0
SORE THROAT: 0

## 2023-08-21 NOTE — PROGRESS NOTES
Subjective   Patient ID: Elsie Renteria is a 57 y.o. female.    Patient here to refill phentermine. She has lost weight. Understands the medication, risk and benefits. No uncontrolled BP. No hx stroke, MI. Is following a healthy diet with a caloric goal below 1200 daily. She is committed to exercising at least 3x a week for at least 40 minutes. She does not smoke. She has no CP, SOB or palpitations.      OARRS:  No data recorded  I have personally reviewed the OARRS report for Elsie Renteria. I have considered the risks of abuse, dependence, addiction and diversion and I believe that it is clinically appropriate for Elsie Renteria to be prescribed this medication    Is the patient prescribed a combination of a benzodiazepine and opioid?  No    Last Urine Drug Screen / ordered today: Yes  Recent Results (from the past 75574 hour(s))   Drug Screen, Urine With Reflex to Confirmation    Collection Time: 04/07/23 10:27 AM   Result Value Ref Range    DRUG SCREEN COMMENT URINE SEE BELOW     Amphetamine Screen, Urine PRESUMPTIVE NEGATIVE NEGATIVE    Barbiturate Screen, Urine PRESUMPTIVE NEGATIVE NEGATIVE    BENZODIAZEPINE (PRESENCE) IN URINE BY SCREEN METHOD PRESUMPTIVE NEGATIVE NEGATIVE    Cannabinoid Screen, Urine PRESUMPTIVE NEGATIVE NEGATIVE    Cocaine Screen, Urine PRESUMPTIVE NEGATIVE NEGATIVE    Fentanyl, Ur PRESUMPTIVE NEGATIVE NEGATIVE    Methadone Screen, Urine PRESUMPTIVE NEGATIVE NEGATIVE    Opiate Screen, Urine PRESUMPTIVE NEGATIVE NEGATIVE    Oxycodone Screen, Ur PRESUMPTIVE NEGATIVE NEGATIVE    PCP Screen, Urine PRESUMPTIVE NEGATIVE NEGATIVE   OPIATE/OPIOID/BENZO PRESCRIPTION COMPLIANCE    Collection Time: 04/28/22  9:15 AM   Result Value Ref Range    DRUG SCREEN COMMENT URINE SEE BELOW     Creatine, Urine 179.0 mg/dL    Amphetamine Screen, Urine PRESUMPTIVE NEGATIVE NEGATIVE    Barbiturate Screen, Urine PRESUMPTIVE NEGATIVE NEGATIVE    Cannabinoid Screen, Urine PRESUMPTIVE NEGATIVE NEGATIVE    Cocaine Screen, Urine  PRESUMPTIVE NEGATIVE NEGATIVE    PCP Screen, Urine PRESUMPTIVE NEGATIVE NEGATIVE    7-Aminoclonazepam <25 Cutoff <25 ng/mL    Alpha-Hydroxyalprazolam <25 Cutoff <25 ng/mL    Alpha-Hydroxymidazolam <25 Cutoff <25 ng/mL    Alprazolam <25 Cutoff <25 ng/mL    Chlordiazepoxide <25 Cutoff <25 ng/mL    Clonazepam <25 Cutoff <25 ng/mL    Diazepam <25 Cutoff <25 ng/mL    Lorazepam <25 Cutoff <25 ng/mL    Midazolam <25 Cutoff <25 ng/mL    Nordiazepam <25 Cutoff <25 ng/mL    Oxazepam <25 Cutoff <25 ng/mL    Temazepam <25 Cutoff <25 ng/mL    Zolpidem <25 Cutoff <25 ng/mL    Zolpidem Metabolite (ZCA) <25 Cutoff <25 ng/mL    6-Acetylmorphine <25 Cutoff <25 ng/mL    Codeine <50 Cutoff <50 ng/mL    Hydrocodone <25 Cutoff <25 ng/mL    Hydromorphone <25 Cutoff <25 ng/mL    Morphine Urine <50 Cutoff <50 ng/mL    Norhydrocodone <25 Cutoff <25 ng/mL    Noroxycodone <25 Cutoff <25 ng/mL    Oxycodone <25 Cutoff <25 ng/mL    Oxymorphone <25 Cutoff <25 ng/mL    Tramadol <50 Cutoff <50 ng/mL    O-Desmethyltramadol <50 Cutoff <50 ng/mL    Fentanyl <2.5 Cutoff<2.5 ng/mL    Norfentanyl <2.5 Cutoff<2.5 ng/mL    METHADONE CONFIRMATION,URINE <25 Cutoff <25 ng/mL    EDDP <25 Cutoff <25 ng/mL     Results are as expected.     Controlled Substance Agreement:  Date of the Last Agreement: 4/7/23  Reviewed Controlled Substance Agreement including but not limited to the benefits, risks, and alternatives to treatment with a Controlled Substance medication(s).    Anorexiants:   What is the patient's goal of therapy? Lose weight  Is this being achieved with current treatment? yes    I have assessed the patient's continuing efforts to lose weight., I have assessed the patient's dedication to the treatment program and the response to treatment., and I have assessed the presence or absence of contraindications, adverse effects, and indicators of possible substance abuse that would necessitate cessation of treatment utilizing controlled substance.    Patient has  demonstrated continued efforts to lose weight, is dedicated to the treatment program and the response to treatment. and I have assessed for the presence or absence of contraindications, adverse effects, and indicators of possible substance abuse that would necessitate cessation of treatment utilizing controlled substance.    Activities of Daily Living:   Is your overall impression that this patient is benefiting (symptom reduction outweighs side effects) from anorexiants therapy? Yes     1. Physical Functioning: Better  2. Family Relationship: Same  3. Social Relationship: Same  4. Mood: Same  5. Sleep Patterns: Same  6. Overall Function: Same      Review of Systems   Constitutional:  Negative for fatigue, fever and unexpected weight change.   HENT:  Negative for congestion, ear pain, hearing loss, sore throat and trouble swallowing.    Eyes:  Negative for pain and visual disturbance.   Respiratory:  Negative for cough and shortness of breath.    Cardiovascular:  Negative for chest pain, palpitations and leg swelling.   Gastrointestinal:  Negative for abdominal pain, blood in stool, diarrhea, nausea and vomiting.   Genitourinary:  Negative for dysuria, frequency, hematuria and urgency.   Musculoskeletal:  Negative for joint swelling.   Skin:  Negative for pallor and rash.   Neurological:  Negative for dizziness, syncope, weakness, numbness and headaches.   Psychiatric/Behavioral:  Negative for confusion, decreased concentration, hallucinations and suicidal ideas.      Vitals:    08/21/23 1133   BP: 126/84   Pulse: 55   Temp: 36.3 °C (97.3 °F)   SpO2: 98%      Objective   Physical Exam  Constitutional:       Appearance: Normal appearance. She is obese.   Cardiovascular:      Rate and Rhythm: Normal rate and regular rhythm.      Heart sounds: Normal heart sounds.   Pulmonary:      Effort: Pulmonary effort is normal.      Breath sounds: Normal breath sounds.   Musculoskeletal:      Cervical back: Neck supple.   Skin:      General: Skin is warm and dry.   Neurological:      General: No focal deficit present.      Mental Status: She is alert.   Psychiatric:         Mood and Affect: Mood normal.         Speech: Speech normal.         Behavior: Behavior normal.         Cognition and Memory: Cognition normal.         Assessment/Plan   There are no diagnoses linked to this encounter.

## 2023-08-29 DIAGNOSIS — F41.9 ANXIETY DISORDER, UNSPECIFIED TYPE: Primary | ICD-10-CM

## 2023-08-29 RX ORDER — VENLAFAXINE HYDROCHLORIDE 37.5 MG/1
37.5 CAPSULE, EXTENDED RELEASE ORAL DAILY
Qty: 90 CAPSULE | Refills: 2 | Status: SHIPPED | OUTPATIENT
Start: 2023-08-29 | End: 2024-04-29 | Stop reason: SDUPTHER

## 2023-09-15 ENCOUNTER — OFFICE VISIT (OUTPATIENT)
Dept: PRIMARY CARE | Facility: CLINIC | Age: 58
End: 2023-09-15
Payer: COMMERCIAL

## 2023-09-15 VITALS
OXYGEN SATURATION: 95 % | SYSTOLIC BLOOD PRESSURE: 110 MMHG | HEIGHT: 67 IN | TEMPERATURE: 97.1 F | WEIGHT: 238 LBS | DIASTOLIC BLOOD PRESSURE: 70 MMHG | HEART RATE: 73 BPM | BODY MASS INDEX: 37.35 KG/M2

## 2023-09-15 DIAGNOSIS — J30.81 ALLERGIC RHINITIS DUE TO ANIMAL HAIR AND DANDER: Primary | ICD-10-CM

## 2023-09-15 PROBLEM — M25.672 STIFFNESS OF LEFT ANKLE, NOT ELSEWHERE CLASSIFIED: Status: RESOLVED | Noted: 2023-04-07 | Resolved: 2023-09-15

## 2023-09-15 PROBLEM — R29.898 WEAKNESS OF LEFT FOOT: Status: RESOLVED | Noted: 2023-04-07 | Resolved: 2023-09-15

## 2023-09-15 PROBLEM — M25.572 LEFT ANKLE PAIN: Status: RESOLVED | Noted: 2023-04-07 | Resolved: 2023-09-15

## 2023-09-15 PROBLEM — E66.01 MORBID OBESITY WITH BMI OF 40.0-44.9, ADULT (MULTI): Status: RESOLVED | Noted: 2023-06-01 | Resolved: 2023-09-15

## 2023-09-15 PROBLEM — J45.990 EXERCISE-INDUCED ASTHMA (HHS-HCC): Status: RESOLVED | Noted: 2023-04-07 | Resolved: 2023-09-15

## 2023-09-15 PROCEDURE — 3008F BODY MASS INDEX DOCD: CPT | Performed by: FAMILY MEDICINE

## 2023-09-15 PROCEDURE — 1036F TOBACCO NON-USER: CPT | Performed by: FAMILY MEDICINE

## 2023-09-15 PROCEDURE — 96372 THER/PROPH/DIAG INJ SC/IM: CPT | Performed by: FAMILY MEDICINE

## 2023-09-15 PROCEDURE — 99213 OFFICE O/P EST LOW 20 MIN: CPT | Performed by: FAMILY MEDICINE

## 2023-09-15 RX ORDER — PHENTERMINE HYDROCHLORIDE 37.5 MG/1
37.5 TABLET ORAL DAILY
Qty: 30 TABLET | Refills: 0 | Status: SHIPPED | OUTPATIENT
Start: 2023-09-15 | End: 2024-02-28 | Stop reason: ALTCHOICE

## 2023-09-15 RX ORDER — TRIAMCINOLONE ACETONIDE 40 MG/ML
60 INJECTION, SUSPENSION INTRA-ARTICULAR; INTRAMUSCULAR ONCE
Status: COMPLETED | OUTPATIENT
Start: 2023-09-15 | End: 2023-09-15

## 2023-09-15 RX ADMIN — TRIAMCINOLONE ACETONIDE 60 MG: 40 INJECTION, SUSPENSION INTRA-ARTICULAR; INTRAMUSCULAR at 08:23

## 2023-09-15 ASSESSMENT — ENCOUNTER SYMPTOMS
NAUSEA: 0
SHORTNESS OF BREATH: 0
DIARRHEA: 0
PALPITATIONS: 0
DYSURIA: 0
JOINT SWELLING: 0
COUGH: 0
FEVER: 0
TREMORS: 0
FATIGUE: 0
BRUISES/BLEEDS EASILY: 0
EYE ITCHING: 1
HEMATURIA: 0
VOMITING: 0
LIGHT-HEADEDNESS: 0
BLOOD IN STOOL: 0
TROUBLE SWALLOWING: 0
WEAKNESS: 0
DYSPHORIC MOOD: 0
ABDOMINAL PAIN: 0
SINUS PAIN: 0
RHINORRHEA: 1
UNEXPECTED WEIGHT CHANGE: 0

## 2023-09-15 ASSESSMENT — PAIN SCALES - GENERAL: PAINLEVEL: 0-NO PAIN

## 2023-09-15 NOTE — PROGRESS NOTES
Subjective   Patient ID: Elsie Renteria is a 57 y.o. female.    Patient here to refill phentermine. She has lost weight. Understands the medication, risk and benefits. No uncontrolled BP. No hx stroke, MI. Is following a healthy diet with a caloric goal below 1200 daily. She is committed to exercising at least 3x a week for at least 40 minutes. She does not smoke. She has no CP, SOB or palpitations.  Allergic rhinitis non responsive to OTC medications.      OARRS:  No data recorded  I have personally reviewed the OARRS report for Elsie Renteria. I have considered the risks of abuse, dependence, addiction and diversion and I believe that it is clinically appropriate for Elsie Renteria to be prescribed this medication    Is the patient prescribed a combination of a benzodiazepine and opioid?  Yes, I feel it is clincially indicated to continue the medication and have discussed with the patient risks/benefits/alternatives.    Last Urine Drug Screen / ordered today: Yes  Recent Results (from the past 8760 hour(s))   Drug Screen, Urine With Reflex to Confirmation    Collection Time: 04/07/23 10:27 AM   Result Value Ref Range    DRUG SCREEN COMMENT URINE SEE BELOW     Amphetamine Screen, Urine PRESUMPTIVE NEGATIVE NEGATIVE    Barbiturate Screen, Urine PRESUMPTIVE NEGATIVE NEGATIVE    BENZODIAZEPINE (PRESENCE) IN URINE BY SCREEN METHOD PRESUMPTIVE NEGATIVE NEGATIVE    Cannabinoid Screen, Urine PRESUMPTIVE NEGATIVE NEGATIVE    Cocaine Screen, Urine PRESUMPTIVE NEGATIVE NEGATIVE    Fentanyl, Ur PRESUMPTIVE NEGATIVE NEGATIVE    Methadone Screen, Urine PRESUMPTIVE NEGATIVE NEGATIVE    Opiate Screen, Urine PRESUMPTIVE NEGATIVE NEGATIVE    Oxycodone Screen, Ur PRESUMPTIVE NEGATIVE NEGATIVE    PCP Screen, Urine PRESUMPTIVE NEGATIVE NEGATIVE     Results are as expected.   Clinical rationale for not completing a Urine Drug Screen:  done    Controlled Substance Agreement:  Date of the Last Agreement: 4/7/23  Reviewed Controlled Substance  Agreement including but not limited to the benefits, risks, and alternatives to treatment with a Controlled Substance medication(s).    Anorexiants:   What is the patient's goal of therapy? Lose weight  Is this being achieved with current treatment? yes    I have assessed the patient's continuing efforts to lose weight., I have assessed the patient's dedication to the treatment program and the response to treatment., and I have assessed the presence or absence of contraindications, adverse effects, and indicators of possible substance abuse that would necessitate cessation of treatment utilizing controlled substance.    Patient has demonstrated continued efforts to lose weight, is dedicated to the treatment program and the response to treatment.    Activities of Daily Living:   Is your overall impression that this patient is benefiting (symptom reduction outweighs side effects) from anorexiants therapy? Yes     1. Physical Functioning: Same  2. Family Relationship: Same  3. Social Relationship: Same  4. Mood: Same  5. Sleep Patterns: Same  6. Overall Function: Same    Review of Systems   Constitutional:  Negative for fatigue, fever and unexpected weight change.   HENT:  Positive for postnasal drip and rhinorrhea. Negative for congestion, ear pain, nosebleeds, sinus pain and trouble swallowing.    Eyes:  Positive for itching. Negative for visual disturbance.   Respiratory:  Negative for cough and shortness of breath.    Cardiovascular:  Negative for chest pain and palpitations.   Gastrointestinal:  Negative for abdominal pain, blood in stool, diarrhea, nausea and vomiting.   Genitourinary:  Negative for dysuria and hematuria.   Musculoskeletal:  Negative for gait problem and joint swelling.   Neurological:  Negative for tremors, weakness and light-headedness.   Hematological:  Does not bruise/bleed easily.   Psychiatric/Behavioral:  Negative for dysphoric mood and suicidal ideas.      Vitals:    09/15/23 0753   BP:  110/70   Pulse: 73   Temp: 36.2 °C (97.1 °F)   SpO2: 95%      Objective   Physical Exam  Constitutional:       Appearance: Normal appearance.   HENT:      Nose: Congestion and rhinorrhea present.      Mouth/Throat:      Mouth: Mucous membranes are moist.      Pharynx: Oropharynx is clear. No posterior oropharyngeal erythema.   Cardiovascular:      Rate and Rhythm: Normal rate and regular rhythm.      Heart sounds: Normal heart sounds.   Pulmonary:      Effort: Pulmonary effort is normal.      Breath sounds: Normal breath sounds.   Musculoskeletal:      Cervical back: Neck supple.   Skin:     General: Skin is warm and dry.   Neurological:      General: No focal deficit present.      Mental Status: She is alert.   Psychiatric:         Mood and Affect: Mood normal.         Speech: Speech normal.         Behavior: Behavior normal.         Cognition and Memory: Cognition normal.         Assessment/Plan   Diagnoses and all orders for this visit:  Allergic rhinitis due to animal hair and dander  -     triamcinolone acetonide (Kenalog-40) injection 60 mg  BMI 37.0-37.9, adult  -     phentermine (Adipex-P) 37.5 mg tablet; Take 1 tablet (37.5 mg) by mouth once daily.

## 2023-10-09 ENCOUNTER — APPOINTMENT (OUTPATIENT)
Dept: PRIMARY CARE | Facility: CLINIC | Age: 58
End: 2023-10-09
Payer: COMMERCIAL

## 2024-02-19 ENCOUNTER — TELEPHONE (OUTPATIENT)
Dept: PRIMARY CARE | Facility: CLINIC | Age: 59
End: 2024-02-19
Payer: COMMERCIAL

## 2024-02-19 NOTE — TELEPHONE ENCOUNTER
Patient called in requesting a kenalog injection, is pt ok for a nurse visit or does she need to be seen by Didier?

## 2024-02-28 ENCOUNTER — OFFICE VISIT (OUTPATIENT)
Dept: PRIMARY CARE | Facility: CLINIC | Age: 59
End: 2024-02-28
Payer: COMMERCIAL

## 2024-02-28 DIAGNOSIS — J45.40 MODERATE PERSISTENT REACTIVE AIRWAY DISEASE WITHOUT COMPLICATION (HHS-HCC): ICD-10-CM

## 2024-02-28 DIAGNOSIS — J45.990 EXERCISE-INDUCED ASTHMA (HHS-HCC): ICD-10-CM

## 2024-02-28 DIAGNOSIS — J45.990 EXERCISE-INDUCED ASTHMA (HHS-HCC): Primary | ICD-10-CM

## 2024-02-28 PROBLEM — E66.3 OVERWEIGHT: Status: ACTIVE | Noted: 2024-02-28

## 2024-02-28 PROBLEM — J44.9 CHRONIC OBSTRUCTIVE PULMONARY DISEASE (MULTI): Status: ACTIVE | Noted: 2024-02-28

## 2024-02-28 PROBLEM — J30.2 SEASONAL ALLERGIC RHINITIS: Status: ACTIVE | Noted: 2024-02-28

## 2024-02-28 PROBLEM — E66.9 OBESITY: Status: ACTIVE | Noted: 2024-02-28

## 2024-02-28 PROBLEM — E66.9 OBESITY WITH BODY MASS INDEX 30 OR GREATER: Status: ACTIVE | Noted: 2023-08-02

## 2024-02-28 PROBLEM — J44.9 CHRONIC OBSTRUCTIVE PULMONARY DISEASE (MULTI): Status: RESOLVED | Noted: 2024-02-28 | Resolved: 2024-02-28

## 2024-02-28 PROBLEM — E66.1 DRUG-INDUCED OBESITY: Status: ACTIVE | Noted: 2024-02-28

## 2024-02-28 PROCEDURE — 99213 OFFICE O/P EST LOW 20 MIN: CPT | Performed by: FAMILY MEDICINE

## 2024-02-28 PROCEDURE — 3008F BODY MASS INDEX DOCD: CPT | Performed by: FAMILY MEDICINE

## 2024-02-28 PROCEDURE — 1036F TOBACCO NON-USER: CPT | Performed by: FAMILY MEDICINE

## 2024-02-28 RX ORDER — BUDESONIDE AND FORMOTEROL FUMARATE DIHYDRATE 80; 4.5 UG/1; UG/1
AEROSOL RESPIRATORY (INHALATION)
Qty: 10.2 G | Refills: 5 | Status: SHIPPED | OUTPATIENT
Start: 2024-02-28 | End: 2024-02-28 | Stop reason: SDUPTHER

## 2024-02-28 RX ORDER — CLOBETASOL PROPIONATE 0.5 MG/G
OINTMENT TOPICAL
Qty: 45 G | Refills: 2 | Status: SHIPPED | OUTPATIENT
Start: 2024-02-28 | End: 2024-02-28 | Stop reason: SDUPTHER

## 2024-02-28 ASSESSMENT — ENCOUNTER SYMPTOMS
HEMATURIA: 0
DECREASED CONCENTRATION: 0
UNEXPECTED WEIGHT CHANGE: 0
VOMITING: 0
FREQUENCY: 0
HALLUCINATIONS: 0
DYSURIA: 0
BLOOD IN STOOL: 0
PALPITATIONS: 0
EYE PAIN: 0
NUMBNESS: 0
HEADACHES: 0
TROUBLE SWALLOWING: 0
CONFUSION: 0
DIZZINESS: 0
FEVER: 0
ABDOMINAL PAIN: 0
DIARRHEA: 0
WEAKNESS: 0
JOINT SWELLING: 0
NAUSEA: 0
SORE THROAT: 0
COUGH: 0
FATIGUE: 0
SHORTNESS OF BREATH: 1

## 2024-02-28 NOTE — PROGRESS NOTES
Subjective   Patient ID: Elsie Renteria is a 58 y.o. female.    Patient is seen today for allergies and exercise-induced asthma.  Zyrtec, Singulair and albuterol are not helping.  She is getting allergy shots every 5 months but we discussed she cannot keep doing that.        Review of Systems   Constitutional:  Negative for fatigue, fever and unexpected weight change.   HENT:  Negative for congestion, ear pain, hearing loss, sore throat and trouble swallowing.    Eyes:  Negative for pain and visual disturbance.   Respiratory:  Positive for shortness of breath. Negative for cough.    Cardiovascular:  Negative for chest pain, palpitations and leg swelling.   Gastrointestinal:  Negative for abdominal pain, blood in stool, diarrhea, nausea and vomiting.   Genitourinary:  Negative for dysuria, frequency, hematuria and urgency.   Musculoskeletal:  Negative for joint swelling.   Skin:  Negative for pallor and rash.   Neurological:  Negative for dizziness, syncope, weakness, numbness and headaches.   Psychiatric/Behavioral:  Negative for confusion, decreased concentration, hallucinations and suicidal ideas.      There were no vitals filed for this visit.   Objective   Physical Exam  Constitutional:       Appearance: Normal appearance. She is obese.   Cardiovascular:      Rate and Rhythm: Normal rate and regular rhythm.      Heart sounds: Normal heart sounds.   Pulmonary:      Effort: Pulmonary effort is normal.      Breath sounds: Normal breath sounds.   Musculoskeletal:      Cervical back: Neck supple.   Skin:     General: Skin is warm and dry.   Neurological:      General: No focal deficit present.      Mental Status: She is alert.   Psychiatric:         Mood and Affect: Mood normal.         Speech: Speech normal.         Behavior: Behavior normal.         Cognition and Memory: Cognition normal.         Assessment/Plan   Diagnoses and all orders for this visit:  Exercise-induced asthma  -     clobetasol (Temovate) 0.05 %  ointment; Apply topically once daily. Sparingly to affected area(s)  -     budesonide-formoteroL (Symbicort) 80-4.5 mcg/actuation inhaler; 2 puffs before exercise. Rinse mouth with water after use to reduce aftertaste and incidence of candidiasis. Do not swallow.  -     Referral to Pulmonology; Future  Moderate persistent reactive airway disease without complication

## 2024-02-28 NOTE — PATIENT INSTRUCTIONS
Add symbacort prn instead of albuterol, new iication  If no help then to pulmonology  Try to avoid kenalog shot

## 2024-03-01 RX ORDER — CLOBETASOL PROPIONATE 0.5 MG/G
OINTMENT TOPICAL
Qty: 45 G | Refills: 1 | Status: SHIPPED | OUTPATIENT
Start: 2024-03-01 | End: 2025-03-01

## 2024-03-01 RX ORDER — BUDESONIDE AND FORMOTEROL FUMARATE DIHYDRATE 80; 4.5 UG/1; UG/1
AEROSOL RESPIRATORY (INHALATION)
Qty: 10.2 G | Refills: 5 | Status: SHIPPED | OUTPATIENT
Start: 2024-03-01

## 2024-03-06 ENCOUNTER — APPOINTMENT (OUTPATIENT)
Dept: PRIMARY CARE | Facility: CLINIC | Age: 59
End: 2024-03-06
Payer: COMMERCIAL

## 2024-04-24 ENCOUNTER — OFFICE VISIT (OUTPATIENT)
Dept: PULMONOLOGY | Facility: CLINIC | Age: 59
End: 2024-04-24
Payer: COMMERCIAL

## 2024-04-24 ENCOUNTER — LAB (OUTPATIENT)
Dept: LAB | Facility: LAB | Age: 59
End: 2024-04-24
Payer: COMMERCIAL

## 2024-04-24 ENCOUNTER — HOSPITAL ENCOUNTER (OUTPATIENT)
Dept: RADIOLOGY | Facility: HOSPITAL | Age: 59
Discharge: HOME | End: 2024-04-24
Payer: COMMERCIAL

## 2024-04-24 VITALS
OXYGEN SATURATION: 98 % | HEART RATE: 51 BPM | WEIGHT: 253 LBS | BODY MASS INDEX: 39.63 KG/M2 | TEMPERATURE: 95.7 F | DIASTOLIC BLOOD PRESSURE: 84 MMHG | SYSTOLIC BLOOD PRESSURE: 133 MMHG

## 2024-04-24 DIAGNOSIS — J45.990 EXERCISE-INDUCED ASTHMA (HHS-HCC): ICD-10-CM

## 2024-04-24 DIAGNOSIS — R06.09 DOE (DYSPNEA ON EXERTION): Primary | ICD-10-CM

## 2024-04-24 LAB
BASOPHILS # BLD AUTO: 0.04 X10*3/UL (ref 0–0.1)
BASOPHILS NFR BLD AUTO: 0.7 %
EOSINOPHIL # BLD AUTO: 0.25 X10*3/UL (ref 0–0.7)
EOSINOPHIL NFR BLD AUTO: 4.4 %
ERYTHROCYTE [DISTWIDTH] IN BLOOD BY AUTOMATED COUNT: 12.7 % (ref 11.5–14.5)
HCT VFR BLD AUTO: 43.4 % (ref 36–46)
HGB BLD-MCNC: 13.9 G/DL (ref 12–16)
IMM GRANULOCYTES # BLD AUTO: 0.02 X10*3/UL (ref 0–0.7)
IMM GRANULOCYTES NFR BLD AUTO: 0.3 % (ref 0–0.9)
LYMPHOCYTES # BLD AUTO: 1.3 X10*3/UL (ref 1.2–4.8)
LYMPHOCYTES NFR BLD AUTO: 22.7 %
MCH RBC QN AUTO: 30.3 PG (ref 26–34)
MCHC RBC AUTO-ENTMCNC: 32 G/DL (ref 32–36)
MCV RBC AUTO: 95 FL (ref 80–100)
MONOCYTES # BLD AUTO: 0.69 X10*3/UL (ref 0.1–1)
MONOCYTES NFR BLD AUTO: 12.1 %
NEUTROPHILS # BLD AUTO: 3.42 X10*3/UL (ref 1.2–7.7)
NEUTROPHILS NFR BLD AUTO: 59.8 %
NRBC BLD-RTO: 0 /100 WBCS (ref 0–0)
PLATELET # BLD AUTO: 273 X10*3/UL (ref 150–450)
RBC # BLD AUTO: 4.58 X10*6/UL (ref 4–5.2)
WBC # BLD AUTO: 5.7 X10*3/UL (ref 4.4–11.3)

## 2024-04-24 PROCEDURE — 85025 COMPLETE CBC W/AUTO DIFF WBC: CPT

## 2024-04-24 PROCEDURE — 36415 COLL VENOUS BLD VENIPUNCTURE: CPT

## 2024-04-24 PROCEDURE — 1036F TOBACCO NON-USER: CPT | Performed by: INTERNAL MEDICINE

## 2024-04-24 PROCEDURE — 86003 ALLG SPEC IGE CRUDE XTRC EA: CPT

## 2024-04-24 PROCEDURE — 99214 OFFICE O/P EST MOD 30 MIN: CPT | Performed by: INTERNAL MEDICINE

## 2024-04-24 PROCEDURE — 82785 ASSAY OF IGE: CPT

## 2024-04-24 PROCEDURE — 3008F BODY MASS INDEX DOCD: CPT | Performed by: INTERNAL MEDICINE

## 2024-04-24 PROCEDURE — 99204 OFFICE O/P NEW MOD 45 MIN: CPT | Performed by: INTERNAL MEDICINE

## 2024-04-24 PROCEDURE — 71046 X-RAY EXAM CHEST 2 VIEWS: CPT | Performed by: RADIOLOGY

## 2024-04-24 PROCEDURE — 71046 X-RAY EXAM CHEST 2 VIEWS: CPT

## 2024-04-24 ASSESSMENT — ENCOUNTER SYMPTOMS
RHINORRHEA: 0
SPEECH DIFFICULTY: 0
AGITATION: 0
SINUS PAIN: 0
LIGHT-HEADEDNESS: 0
DIFFICULTY URINATING: 0
HEADACHES: 0
CONSTIPATION: 0
ABDOMINAL PAIN: 0
STRIDOR: 0
WHEEZING: 0
ADENOPATHY: 0
FEVER: 0
COUGH: 0
DYSURIA: 0
PALPITATIONS: 0
ARTHRALGIAS: 0
TREMORS: 0
NUMBNESS: 0
APNEA: 0
UNEXPECTED WEIGHT CHANGE: 0
SLEEP DISTURBANCE: 0
NAUSEA: 0
EYE REDNESS: 0
NERVOUS/ANXIOUS: 0
WEAKNESS: 0
BRUISES/BLEEDS EASILY: 0
SHORTNESS OF BREATH: 1
HEMATURIA: 0
ABDOMINAL DISTENTION: 0
SINUS PRESSURE: 1
CHOKING: 0
FACIAL SWELLING: 0
FATIGUE: 0
FREQUENCY: 0
EYE DISCHARGE: 0
JOINT SWELLING: 0
DIZZINESS: 0

## 2024-04-24 ASSESSMENT — PAIN SCALES - GENERAL: PAINLEVEL: 0-NO PAIN

## 2024-04-24 NOTE — LETTER
Elsie Renteria is a 58 y.o. year old patient referred for pulmonary evaluation.  My summary is attached of his/her current complaints along with their  pertinent past medical history. Also, I have outlined my diagnostic assessment/plan. If you have any questions please feel free to contact me and as always, thank you for the referral.       Trev Ordonez MD, MPH

## 2024-04-24 NOTE — PATIENT INSTRUCTIONS
Will obtain allergy blood work for CBC with differential looking for eosinophilia along with a regional allergy panel    Will obtain complete pulmonary function tests with a fractional exhalation of nitric oxide to better assess the issue of asthma as well as shortness of breath.      In addition because of the shortness of breath will obtain an echocardiogram    The pulmonary function tests, fractional exhalation of nitric oxide and echocardiogram can be scheduled by calling     In addition when you have the pulmonary function test a chest x-ray can be obtained.

## 2024-04-24 NOTE — PROGRESS NOTES
@PULMONARY CONSULTATION@         Reason for Consult: asthma     ASSESSMENT:   The patient is a 58-year-old with a history of allergies and possible exercise-induced asthma.  She has been using Symbicort 2 puffs before exercising and prior to that albuterol.  She does have obesity with obstructive sleep apnea.  She is complaining of shortness of breath with exercise and at times is not able to take a deep breath in.  It is not clear that her exercise intolerance relates to asthma having no associated coughing or wheezing.  Her lungs are clear on auscultation.  I explained that Kenalog is a long-acting corticosteroid that clearly can help allergy symptoms.  However, it is associated with  potential side effects such as weight gain, osteoporosis etc.  I told her    one needs to objectively determine what is occurring with her and then come up with a treatment plan.      PLAN:   Will obtain allergy blood work for CBC with differential looking for eosinophilia along with a regional allergy panel    Will obtain complete pulmonary function tests with a fractional exhalation of nitric oxide to better assess the issue of asthma as well as shortness of breath.      In addition because of the shortness of breath will obtain an echocardiogram    The pulmonary function tests, fractional exhalation of nitric oxide and echocardiogram can be scheduled by calling     In addition when you have the pulmonary function test a chest x-ray can be obtained.        HISTORY OF PRESENT ILLNESS:  The patient is a 58-year-old with a reported allergies and exercise-induced asthma.  The latter has been treated with Zyrtec Singulair and albuterol which is not helping.  She was receiving allergy shots.  Recently on February 28, 2024 she was given Symbicort to use 2 puffs before exercising.  She also has a history of obesity and previously was on phentermine.  She is said to be a never smoker.  In addition, she has a history of sleep  apnea.    The patient reports around 15 years ago she reported an inability to take deep breaths and she felt some shortness of breath with exercise.  The question of asthma was raised and she also had decreased exercise tolerance.  She was given albuterol to use as needed before exercise and she was thought potentially to have exercise-induced asthma.  Around 5 years ago in the fall she had increased respiratory allergy symptoms and she was given a Kenalog shot.  She states that this miraculously helped her symptoms and her shortness of breath for a period around 5 months.  She has been getting Kenalog shots every 5 months since then.  She notes that when she does not get the shot she has decreased exercise tolerance.  She has no coughing or congestion or wheezing in association with any exercise and shortness of breath.  She has a  and she has decreased capacity when she does not take the Kenalog shot.  She also has sleep apnea and is very compliant with the CPAP.  In addition, she has some eczema with a history of GERD.  She intermittently get sinus congestion but her allergy symptoms were worse years ago.  Her father has hayfever but there is asthma in the family.  She has no aspirin allergy.  She does report that her father has an arrhythmia and her mother with breast cancer.  She has had cats for many years but the last cat in the house was around 2 years ago.  She has not noted any difference in her symptomatology without the cat.  The patient is a never smoker and did have secondhand smoke exposure from her father.  The patient has painted in the past but has never noted any temporal relationship of painting to her symptomatology.  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    Current Outpatient Medications:     budesonide-formoteroL (Symbicort) 80-4.5 mcg/actuation inhaler, 2 puffs before exercise. Rinse mouth with water after use to reduce aftertaste and incidence of candidiasis. Do not swallow., Disp: 10.2 g, Rfl: 5    clobetasol (Temovate) 0.05 % ointment, Apply topically once daily. Sparingly to affected area(s), Disp: 45 g, Rfl: 1    montelukast (Singulair) 10 mg tablet, Take 1 tablet (10 mg) by mouth once daily., Disp: 90 tablet, Rfl: 3    venlafaxine XR (Effexor-XR) 37.5 mg 24 hr capsule, Take 1 capsule (37.5 mg) by mouth once daily. With food, Disp: 90 capsule, Rfl: 2    albuterol 90 mcg/actuation inhaler, Inhale 2 puffs. 1/2 hour before exercise, Disp: , Rfl:      FAMILY HISTORY:   Father was a smoker with a cardiac arrhythmia and seasonal allergies  SOCIAL HISTORY:  Never smoker with some secondhand smoke exposure from her father over the years.  EXPOSURE AND WORK HISTORY:  She has a professor at [employer] in theater.  In the past she has painted regularly but the various fumes generated never bothered her asthma.  She drinks a glass of wine perhaps 4-5 times per week.  At times she does not drink at all.    Review of Systems   Constitutional:  Negative for fatigue, fever and unexpected weight change.   HENT:  Positive for sinus pressure. Negative for congestion, facial swelling, nosebleeds, postnasal drip, rhinorrhea and sinus pain.    Eyes:  Negative for discharge, redness and visual disturbance.   Respiratory:  Positive for shortness of breath. Negative for apnea, cough, choking, wheezing and stridor.    Cardiovascular:  Negative for chest pain, palpitations and leg swelling.   Gastrointestinal:  Negative for abdominal distention, abdominal pain, constipation and nausea.   Endocrine: Negative for cold intolerance and heat intolerance.   Genitourinary:  Negative for difficulty urinating, dysuria, frequency and hematuria.   Musculoskeletal:  Negative for arthralgias,  gait problem and joint swelling.   Allergic/Immunologic: Negative for environmental allergies, food allergies and immunocompromised state.   Neurological:  Negative for dizziness, tremors, syncope, speech difficulty, weakness, light-headedness, numbness and headaches.   Hematological:  Negative for adenopathy. Does not bruise/bleed easily.   Psychiatric/Behavioral:  Negative for agitation, behavioral problems and sleep disturbance. The patient is not nervous/anxious.         Vitals:    04/24/24 0915   BP: 133/84   Pulse: 51   Temp: 35.4 °C (95.7 °F)   SpO2: 98%        Physical Exam  Vitals reviewed.   Constitutional:       Appearance: Normal appearance. She is obese.   HENT:      Head: Normocephalic and atraumatic.   Eyes:      Extraocular Movements: Extraocular movements intact.   Cardiovascular:      Rate and Rhythm: Normal rate and regular rhythm.      Heart sounds: No murmur heard.     No friction rub. No gallop.   Pulmonary:      Effort: Pulmonary effort is normal. No respiratory distress.      Breath sounds: Normal breath sounds. No stridor. No wheezing, rhonchi or rales.   Chest:      Chest wall: No tenderness.   Abdominal:      General: Abdomen is flat. There is no distension.      Palpations: Abdomen is soft. There is no mass.      Tenderness: There is no abdominal tenderness.   Musculoskeletal:         General: Normal range of motion.      Cervical back: Normal range of motion.      Right lower leg: No edema.      Left lower leg: No edema.   Skin:     General: Skin is warm and dry.   Neurological:      Mental Status: She is alert and oriented to person, place, and time.   Psychiatric:         Mood and Affect: Mood normal.         Behavior: Behavior normal.

## 2024-04-25 ENCOUNTER — HOSPITAL ENCOUNTER (OUTPATIENT)
Dept: CARDIOLOGY | Facility: HOSPITAL | Age: 59
Discharge: HOME | End: 2024-04-25
Payer: COMMERCIAL

## 2024-04-25 DIAGNOSIS — Z13.6 ENCOUNTER FOR SCREENING FOR CARDIOVASCULAR DISORDERS: ICD-10-CM

## 2024-04-25 DIAGNOSIS — J45.990 EXERCISE-INDUCED ASTHMA (HHS-HCC): ICD-10-CM

## 2024-04-25 DIAGNOSIS — R06.09 DOE (DYSPNEA ON EXERTION): ICD-10-CM

## 2024-04-25 LAB
A ALTERNATA IGE QN: <0.1 KU/L
A FUMIGATUS IGE QN: <0.1 KU/L
BERMUDA GRASS IGE QN: <0.1 KU/L
BOXELDER IGE QN: 0.18 KU/L
C HERBARUM IGE QN: <0.1 KU/L
CALIF WALNUT POLN IGE QN: <0.1 KU/L
CAT DANDER IGE QN: 0.75 KU/L
CMN PIGWEED IGE QN: <0.1 KU/L
COMMON RAGWEED IGE QN: <0.1 KU/L
COTTONWOOD IGE QN: <0.1 KU/L
D FARINAE IGE QN: 5.88 KU/L
D PTERONYSS IGE QN: 3.54 KU/L
DOG DANDER IGE QN: <0.1 KU/L
ENGL PLANTAIN IGE QN: <0.1 KU/L
GOOSEFOOT IGE QN: <0.1 KU/L
JOHNSON GRASS IGE QN: <0.1 KU/L
KENT BLUE GRASS IGE QN: 0.27 KU/L
LONDON PLANE IGE QN: <0.1 KU/L
MT JUNIPER IGE QN: <0.1 KU/L
P NOTATUM IGE QN: <0.1 KU/L
PECAN/HICK TREE IGE QN: <0.1 KU/L
ROACH IGE QN: <0.1 KU/L
SALTWORT IGE QN: <0.1 KU/L
SHEEP SORREL IGE QN: <0.1 KU/L
SILVER BIRCH IGE QN: <0.1 KU/L
TIMOTHY IGE QN: <0.1 KU/L
TOTAL IGE SMQN RAST: 38.2 KU/L
WHITE ASH IGE QN: <0.1 KU/L
WHITE ELM IGE QN: <0.1 KU/L
WHITE MULBERRY IGE QN: <0.1 KU/L
WHITE OAK IGE QN: <0.1 KU/L

## 2024-04-25 PROCEDURE — 93306 TTE W/DOPPLER COMPLETE: CPT

## 2024-04-25 PROCEDURE — 93306 TTE W/DOPPLER COMPLETE: CPT | Performed by: INTERNAL MEDICINE

## 2024-04-26 LAB
AORTIC VALVE MEAN GRADIENT: 4.7 MMHG
AORTIC VALVE PEAK VELOCITY: 1.59 M/S
AV PEAK GRADIENT: 10.1 MMHG
EJECTION FRACTION APICAL 4 CHAMBER: 70.1
LEFT ATRIUM VOLUME AREA LENGTH INDEX BSA: 35.9 ML/M2
LEFT VENTRICLE INTERNAL DIMENSION DIASTOLE: 5.87 CM (ref 3.5–6)
LV EJECTION FRACTION BIPLANE: 68 %
MITRAL VALVE E/A RATIO: 1.1

## 2024-04-29 DIAGNOSIS — F41.9 ANXIETY DISORDER, UNSPECIFIED TYPE: ICD-10-CM

## 2024-04-29 RX ORDER — VENLAFAXINE HYDROCHLORIDE 37.5 MG/1
37.5 CAPSULE, EXTENDED RELEASE ORAL DAILY
Qty: 90 CAPSULE | Refills: 3 | Status: SHIPPED | OUTPATIENT
Start: 2024-04-29

## 2024-04-30 NOTE — PROGRESS NOTES
Elsie Renteria is a 58 y.o. female on day 0 of admission presenting with No Principal Problem: There is no principal problem currently on the Problem List. Please update the Problem List and refresh..    Subjective   ***       Objective     Physical Exam    Last Recorded Vitals  There were no vitals taken for this visit.  Intake/Output last 3 Shifts:  No intake/output data recorded.    Relevant Results  {If you would like to pull in Medications, type .meds     If you would like to pull in Lab results for the last 24 hours, type .[id number]    If you would like to pull in Imaging results, type .[email or web address] :99}    {Link to Stroke Scoring tools - Link :99}                       Assessment/Plan   Active Problems:  There are no active Hospital Problems.    ***     {This patient does not have an ACP note on file for this encounter, please fill one out - Advance Care Planning Activity :99}      JANICE FUENTES, RRT

## 2024-05-01 ENCOUNTER — HOSPITAL ENCOUNTER (OUTPATIENT)
Dept: RESPIRATORY THERAPY | Facility: HOSPITAL | Age: 59
Discharge: HOME | End: 2024-05-01
Payer: COMMERCIAL

## 2024-05-01 DIAGNOSIS — J45.990 EXERCISE-INDUCED ASTHMA (HHS-HCC): ICD-10-CM

## 2024-05-01 PROCEDURE — 94726 PLETHYSMOGRAPHY LUNG VOLUMES: CPT | Performed by: INTERNAL MEDICINE

## 2024-05-01 PROCEDURE — 94726 PLETHYSMOGRAPHY LUNG VOLUMES: CPT

## 2024-05-01 PROCEDURE — 94729 DIFFUSING CAPACITY: CPT | Performed by: INTERNAL MEDICINE

## 2024-05-01 PROCEDURE — 94618 PULMONARY STRESS TESTING: CPT | Performed by: INTERNAL MEDICINE

## 2024-05-01 PROCEDURE — 94060 EVALUATION OF WHEEZING: CPT | Performed by: INTERNAL MEDICINE

## 2024-05-02 ENCOUNTER — TELEPHONE (OUTPATIENT)
Dept: PULMONOLOGY | Facility: CLINIC | Age: 59
End: 2024-05-02
Payer: COMMERCIAL

## 2024-05-02 ENCOUNTER — DOCUMENTATION (OUTPATIENT)
Dept: PULMONOLOGY | Facility: HOSPITAL | Age: 59
End: 2024-05-02
Payer: COMMERCIAL

## 2024-05-02 DIAGNOSIS — J45.990 EXERCISE-INDUCED ASTHMA (HHS-HCC): ICD-10-CM

## 2024-05-02 RX ORDER — MONTELUKAST SODIUM 10 MG/1
10 TABLET ORAL DAILY
Qty: 90 TABLET | Refills: 3 | Status: SHIPPED | OUTPATIENT
Start: 2024-05-02

## 2024-05-02 NOTE — PROGRESS NOTES
PFTs, echo  and 6mw all normal; no clear explanation for SANCHEZ      At this point no indication for kenalog injections    I told her to contact Dr Velásquez for further cardiac evaluation and/or referral to cardiology    After that she will [place] back to me      We can always to an asthma exercise test or even a cardiopulmonary exercise test but first she should undergo a cardiac stress or even see cardiology to maker sure her SANCHEZ not an angina  equivalent

## 2024-05-03 LAB
MGC ASCENT PFT - FEV1 - POST: 2.6
MGC ASCENT PFT - FEV1 - POST: 2.6
MGC ASCENT PFT - FEV1 - PRE: 2.6
MGC ASCENT PFT - FEV1 - PRE: 2.6
MGC ASCENT PFT - FEV1 - PREDICTED: 2.61
MGC ASCENT PFT - FEV1 - PREDICTED: 2.61
MGC ASCENT PFT - FVC - POST: 3.44
MGC ASCENT PFT - FVC - POST: 3.44
MGC ASCENT PFT - FVC - PRE: 3.49
MGC ASCENT PFT - FVC - PRE: 3.49
MGC ASCENT PFT - FVC - PREDICTED: 3.26
MGC ASCENT PFT - FVC - PREDICTED: 3.26

## 2024-05-24 ENCOUNTER — OFFICE VISIT (OUTPATIENT)
Dept: PRIMARY CARE | Facility: CLINIC | Age: 59
End: 2024-05-24
Payer: COMMERCIAL

## 2024-05-24 VITALS
BODY MASS INDEX: 39.63 KG/M2 | TEMPERATURE: 98.2 F | SYSTOLIC BLOOD PRESSURE: 110 MMHG | OXYGEN SATURATION: 98 % | DIASTOLIC BLOOD PRESSURE: 70 MMHG | WEIGHT: 253 LBS | HEART RATE: 66 BPM

## 2024-05-24 DIAGNOSIS — R06.09 DYSPNEA ON EXERTION: ICD-10-CM

## 2024-05-24 DIAGNOSIS — J45.30 MILD PERSISTENT EXTRINSIC ASTHMA WITHOUT COMPLICATION (HHS-HCC): Primary | ICD-10-CM

## 2024-05-24 PROCEDURE — 99213 OFFICE O/P EST LOW 20 MIN: CPT | Performed by: FAMILY MEDICINE

## 2024-05-24 PROCEDURE — 3008F BODY MASS INDEX DOCD: CPT | Performed by: FAMILY MEDICINE

## 2024-05-24 ASSESSMENT — ENCOUNTER SYMPTOMS
VOMITING: 0
CONFUSION: 0
HEADACHES: 0
BLOOD IN STOOL: 0
EYE PAIN: 0
FREQUENCY: 0
FEVER: 0
ABDOMINAL PAIN: 0
NUMBNESS: 0
UNEXPECTED WEIGHT CHANGE: 0
SORE THROAT: 0
WEAKNESS: 0
JOINT SWELLING: 0
DIZZINESS: 0
HALLUCINATIONS: 0
COUGH: 0
TROUBLE SWALLOWING: 0
NAUSEA: 0
HEMATURIA: 0
DYSURIA: 0
SHORTNESS OF BREATH: 1
PALPITATIONS: 0
DECREASED CONCENTRATION: 0
DIARRHEA: 0
FATIGUE: 0

## 2024-05-24 ASSESSMENT — PAIN SCALES - GENERAL: PAINLEVEL: 0-NO PAIN

## 2024-05-24 NOTE — PATIENT INSTRUCTIONS
I was hoping pulmonary might of had a little more input.  She clearly does better with the steroids.  Unlikely something cardiac as it should have shown up on the echo.  She has no chest pain, edema or palpitations to suggest a cardiac etiology.  We discussed seeing an allergist and maybe they will be of more help.  I also suggested she try Symbicort routinely at 2 puffs twice a day

## 2024-05-24 NOTE — PROGRESS NOTES
Subjective   Patient ID: Elsie Renteria is a 58 y.o. female.    Patient comes in today somewhat frustrated.  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  She will ride for miles at a time.  She finds that she gets short of breath after she has been writing for a while.  She feels that her endurance and stamina should be fairly good since she has been riding for few years now.  Albuterol inhaler does not seem to help.  Montelukast has not really helped as well as antihistamines.  The only thing that helps is Kenalog IM and she has been doing it for years and understands that she cannot do this forever.  She is doing Symbicort as needed for exercise and does not feel it helps as much as it should.  Echocardiogram was unremarkable.  Pulmonary has been consulted and said it was not her lungs.  I am at a loss as well.        Review of Systems   Constitutional:  Negative for fatigue, fever and unexpected weight change.   HENT:  Negative for congestion, ear pain, hearing loss, sore throat and trouble swallowing.    Eyes:  Negative for pain and visual disturbance.   Respiratory:  Positive for shortness of breath. Negative for cough.    Cardiovascular:  Negative for chest pain, palpitations and leg swelling.   Gastrointestinal:  Negative for abdominal pain, blood in stool, diarrhea, nausea and vomiting.   Genitourinary:  Negative for dysuria, frequency, hematuria and urgency.   Musculoskeletal:  Negative for joint swelling.   Skin:  Negative for pallor and rash.   Neurological:  Negative for dizziness, syncope, weakness, numbness and headaches.   Psychiatric/Behavioral:  Negative for confusion, decreased concentration, hallucinations and suicidal ideas.      Vitals:    05/24/24 1235   BP: 110/70   Pulse: 66   Temp: 36.8 °C (98.2 °F)   SpO2: 98%      Objective   Physical Exam  Constitutional:       Appearance: Normal appearance. She is obese.   Cardiovascular:      Rate and Rhythm: Normal rate and regular rhythm.       Heart sounds: Normal heart sounds.   Pulmonary:      Effort: Pulmonary effort is normal.      Breath sounds: Normal breath sounds.   Musculoskeletal:      Cervical back: Neck supple.   Skin:     General: Skin is warm and dry.   Neurological:      General: No focal deficit present.      Mental Status: She is alert.   Psychiatric:         Mood and Affect: Mood normal.         Speech: Speech normal.         Behavior: Behavior normal.         Cognition and Memory: Cognition normal.         Assessment/Plan   Diagnoses and all orders for this visit:  Mild persistent extrinsic asthma without complication ([facility])  -     Referral to Allergy; Future

## 2024-06-13 ENCOUNTER — APPOINTMENT (OUTPATIENT)
Dept: PRIMARY CARE | Facility: CLINIC | Age: 59
End: 2024-06-13
Payer: COMMERCIAL

## 2024-06-13 VITALS
TEMPERATURE: 97.2 F | OXYGEN SATURATION: 97 % | SYSTOLIC BLOOD PRESSURE: 118 MMHG | BODY MASS INDEX: 39.31 KG/M2 | DIASTOLIC BLOOD PRESSURE: 78 MMHG | HEART RATE: 50 BPM | WEIGHT: 251 LBS

## 2024-06-13 DIAGNOSIS — J45.990 EXERCISE-INDUCED ASTHMA (HHS-HCC): ICD-10-CM

## 2024-06-13 DIAGNOSIS — J45.40 MODERATE PERSISTENT REACTIVE AIRWAY DISEASE WITHOUT COMPLICATION (HHS-HCC): Primary | ICD-10-CM

## 2024-06-13 DIAGNOSIS — E66.9 OBESITY WITH BODY MASS INDEX 30 OR GREATER: ICD-10-CM

## 2024-06-13 PROBLEM — E66.1 DRUG-INDUCED OBESITY: Status: RESOLVED | Noted: 2024-02-28 | Resolved: 2024-06-13

## 2024-06-13 PROCEDURE — 3008F BODY MASS INDEX DOCD: CPT | Performed by: FAMILY MEDICINE

## 2024-06-13 PROCEDURE — 80307 DRUG TEST PRSMV CHEM ANLYZR: CPT

## 2024-06-13 PROCEDURE — 99213 OFFICE O/P EST LOW 20 MIN: CPT | Performed by: FAMILY MEDICINE

## 2024-06-13 RX ORDER — PHENTERMINE HYDROCHLORIDE 37.5 MG/1
37.5 CAPSULE ORAL
Qty: 30 CAPSULE | Refills: 0 | Status: SHIPPED | OUTPATIENT
Start: 2024-06-13 | End: 2024-07-13

## 2024-06-13 RX ORDER — PHENTERMINE HYDROCHLORIDE 37.5 MG/1
37.5 CAPSULE ORAL
Qty: 30 CAPSULE | Refills: 0 | Status: SHIPPED | OUTPATIENT
Start: 2024-06-13 | End: 2024-06-13

## 2024-06-13 ASSESSMENT — ENCOUNTER SYMPTOMS
VOMITING: 0
WEAKNESS: 0
TROUBLE SWALLOWING: 0
FEVER: 0
EYE PAIN: 0
DIZZINESS: 0
DECREASED CONCENTRATION: 0
DIARRHEA: 0
SHORTNESS OF BREATH: 1
PALPITATIONS: 0
NUMBNESS: 0
BLOOD IN STOOL: 0
HALLUCINATIONS: 0
SORE THROAT: 0
DYSURIA: 0
UNEXPECTED WEIGHT CHANGE: 0
NAUSEA: 0
HEADACHES: 0
FREQUENCY: 0
JOINT SWELLING: 0
ABDOMINAL PAIN: 0
HEMATURIA: 0
FATIGUE: 0
COUGH: 0
CONFUSION: 0

## 2024-06-13 ASSESSMENT — PAIN SCALES - GENERAL: PAINLEVEL: 0-NO PAIN

## 2024-06-13 NOTE — PROGRESS NOTES
Subjective   Patient ID: Elsie Renteria is a 58 y.o. female.    Patient is following up from allergist and also would like to start phentermine again.  BMI is 39.31.  She is exercising rigorously through bicycling.  She has no heart problems and no addiction issues.  She has asthma and allergies.  The allergist put her on higher dose Symbicort.  She is very allergic to dust mites and she gave her a very thorough printout of things to do to help it read them in the home.  We are trying to avoid doing any more Kenalog shots.  She does not smoke or drink.    OARRS:  No data recorded  I have personally reviewed the OARRS report for Elsie Renteria. I have considered the risks of abuse, dependence, addiction and diversion and I believe that it is clinically appropriate for Elsie Renteria to be prescribed this medication    Is the patient prescribed a combination of a benzodiazepine and opioid? No    Last Urine Drug Screen / ordered today: Yes  No results found for this or any previous visit (from the past 8760 hour(s)).  Results are as expected.         Controlled Substance Agreement:  Date of the Last Agreement: today  Reviewed Controlled Substance Agreement including but not limited to the benefits, risks, and alternatives to treatment with a Controlled Substance medication(s).    Anorexiants:   What is the patient's goal of therapy? Lose weight  Is this being achieved with current treatment? yes    I have assessed the patient's continuing efforts to lose weight., I have assessed the patient's dedication to the treatment program and the response to treatment., and I have assessed the presence or absence of contraindications, adverse effects, and indicators of possible substance abuse that would necessitate cessation of treatment utilizing controlled substance.    Patient has demonstrated continued efforts to lose weight, is dedicated to the treatment program and the response to treatment. and I have assessed for the presence or  absence of contraindications, adverse effects, and indicators of possible substance abuse that would necessitate cessation of treatment utilizing controlled substance.    Activities of Daily Living:   Is your overall impression that this patient is benefiting (symptom reduction outweighs side effects) from anorexiants therapy? Yes     1. Physical Functioning: Better  2. Family Relationship: Same  3. Social Relationship: Same  4. Mood: Same  5. Sleep Patterns: Same  6. Overall Function: Same      Review of Systems   Constitutional:  Negative for fatigue, fever and unexpected weight change.   HENT:  Negative for congestion, ear pain, hearing loss, sore throat and trouble swallowing.    Eyes:  Negative for pain and visual disturbance.   Respiratory:  Positive for shortness of breath. Negative for cough.    Cardiovascular:  Negative for chest pain, palpitations and leg swelling.   Gastrointestinal:  Negative for abdominal pain, blood in stool, diarrhea, nausea and vomiting.   Genitourinary:  Negative for dysuria, frequency, hematuria and urgency.   Musculoskeletal:  Negative for joint swelling.   Skin:  Negative for pallor and rash.   Neurological:  Negative for dizziness, syncope, weakness, numbness and headaches.   Psychiatric/Behavioral:  Negative for confusion, decreased concentration, hallucinations and suicidal ideas.      Vitals:    06/13/24 0821   BP: 118/78   Pulse: 50   Temp: 36.2 °C (97.2 °F)   SpO2: 97%      Objective   Physical Exam  Constitutional:       Appearance: Normal appearance. She is obese.   Cardiovascular:      Rate and Rhythm: Normal rate and regular rhythm.      Heart sounds: Normal heart sounds.   Pulmonary:      Effort: Pulmonary effort is normal.      Breath sounds: Normal breath sounds.   Musculoskeletal:      Cervical back: Neck supple.   Skin:     General: Skin is warm and dry.   Neurological:      General: No focal deficit present.      Mental Status: She is alert.   Psychiatric:          Mood and Affect: Mood normal.         Speech: Speech normal.         Behavior: Behavior normal.         Cognition and Memory: Cognition normal.         Assessment/Plan   Diagnoses and all orders for this visit:  Moderate persistent reactive airway disease without complication ([facility])  Exercise-induced asthma ([facility])  Obesity with body mass index 30 or greater  -     Drug Screen, Urine With Reflex to Confirmation; Future  -     phentermine 37.5 mg capsule; Take 1 capsule (37.5 mg) by mouth once daily in the morning. Take before meals. BMI 39.31

## 2024-06-13 NOTE — PATIENT INSTRUCTIONS
It was nice to see you today!  Discussed current concerns and addressed   Reviewed recent labs and diagnostics  Reviewed medications list  Continue to eat a healthy diet, exercise at least 3 times a week or more  Plan and follow up discussed  For any further information related to your condition, copy and paste or go to [email or web address]  RF phentermine  Risk and benefit discussed  Discussed caloric limit for age and gender, [employer] Angelina recommended  Exercise at least 3x a week at least 45 min each session aerobically  OARRS reviewed  CSA and UDS current/updated  Follow up 1 month

## 2024-06-14 LAB
AMPHETAMINES UR QL SCN: NORMAL
BARBITURATES UR QL SCN: NORMAL
BENZODIAZ UR QL SCN: NORMAL
BZE UR QL SCN: NORMAL
CANNABINOIDS UR QL SCN: NORMAL
FENTANYL+NORFENTANYL UR QL SCN: NORMAL
METHADONE UR QL SCN: NORMAL
OPIATES UR QL SCN: NORMAL
OXYCODONE+OXYMORPHONE UR QL SCN: NORMAL
PCP UR QL SCN: NORMAL

## 2024-07-02 ENCOUNTER — APPOINTMENT (OUTPATIENT)
Dept: PRIMARY CARE | Facility: CLINIC | Age: 59
End: 2024-07-02
Payer: COMMERCIAL

## 2024-07-08 ENCOUNTER — APPOINTMENT (OUTPATIENT)
Dept: PRIMARY CARE | Facility: CLINIC | Age: 59
End: 2024-07-08
Payer: COMMERCIAL

## 2024-07-08 VITALS
WEIGHT: 244 LBS | OXYGEN SATURATION: 97 % | SYSTOLIC BLOOD PRESSURE: 118 MMHG | BODY MASS INDEX: 38.3 KG/M2 | DIASTOLIC BLOOD PRESSURE: 72 MMHG | HEART RATE: 63 BPM | HEIGHT: 67 IN | TEMPERATURE: 98 F

## 2024-07-08 DIAGNOSIS — J45.990 EXERCISE-INDUCED ASTHMA (HHS-HCC): ICD-10-CM

## 2024-07-08 DIAGNOSIS — E66.9 OBESITY WITH BODY MASS INDEX 30 OR GREATER: Primary | ICD-10-CM

## 2024-07-08 PROBLEM — E66.3 OVERWEIGHT: Status: RESOLVED | Noted: 2024-02-28 | Resolved: 2024-07-08

## 2024-07-08 PROBLEM — J45.909 REACTIVE AIRWAY DISEASE (HHS-HCC): Status: RESOLVED | Noted: 2023-04-07 | Resolved: 2024-07-08

## 2024-07-08 PROBLEM — R06.09 DYSPNEA ON EXERTION: Status: RESOLVED | Noted: 2024-05-24 | Resolved: 2024-07-08

## 2024-07-08 PROBLEM — J30.2 SEASONAL ALLERGIC RHINITIS: Status: RESOLVED | Noted: 2024-02-28 | Resolved: 2024-07-08

## 2024-07-08 PROCEDURE — 3008F BODY MASS INDEX DOCD: CPT | Performed by: FAMILY MEDICINE

## 2024-07-08 PROCEDURE — 99213 OFFICE O/P EST LOW 20 MIN: CPT | Performed by: FAMILY MEDICINE

## 2024-07-08 RX ORDER — PHENTERMINE HYDROCHLORIDE 37.5 MG/1
37.5 CAPSULE ORAL
Qty: 30 CAPSULE | Refills: 0 | Status: SHIPPED | OUTPATIENT
Start: 2024-07-08 | End: 2024-08-07

## 2024-07-08 ASSESSMENT — ENCOUNTER SYMPTOMS
WEAKNESS: 0
FREQUENCY: 0
HALLUCINATIONS: 0
HEMATURIA: 0
ABDOMINAL PAIN: 0
NAUSEA: 0
COUGH: 0
CONFUSION: 0
SHORTNESS OF BREATH: 0
JOINT SWELLING: 0
DECREASED CONCENTRATION: 0
HEADACHES: 0
DIARRHEA: 0
UNEXPECTED WEIGHT CHANGE: 0
EYE PAIN: 0
DIZZINESS: 0
VOMITING: 0
TROUBLE SWALLOWING: 0
BLOOD IN STOOL: 0
NUMBNESS: 0
DYSURIA: 0
PALPITATIONS: 0
FEVER: 0
FATIGUE: 0
SORE THROAT: 0

## 2024-07-08 ASSESSMENT — PAIN SCALES - GENERAL: PAINLEVEL: 0-NO PAIN

## 2024-07-08 NOTE — PROGRESS NOTES
Subjective   Patient ID: Elsie Renteria is a 58 y.o. female.    Patient comes in for follow-up on her asthma as well as a weight check.  She has been having shortness of breath with exertion.  She has asthma.  She is just about feeling back to normal on Symbicort daily.  She has seen pulmonology.  She has no chest pain.  Blood pressure is good.  She has lost 7 pounds on phentermine.  She is having no side effects.  She is watching her diet and is exercising rigorously.  She just completed a 40 mile bike ride out [place].  BMI is 38.22.        Review of Systems   Constitutional:  Negative for fatigue, fever and unexpected weight change.   HENT:  Negative for congestion, ear pain, hearing loss, sore throat and trouble swallowing.    Eyes:  Negative for pain and visual disturbance.   Respiratory:  Negative for cough and shortness of breath.    Cardiovascular:  Negative for chest pain, palpitations and leg swelling.   Gastrointestinal:  Negative for abdominal pain, blood in stool, diarrhea, nausea and vomiting.   Genitourinary:  Negative for dysuria, frequency, hematuria and urgency.   Musculoskeletal:  Negative for joint swelling.   Skin:  Negative for pallor and rash.   Neurological:  Negative for dizziness, syncope, weakness, numbness and headaches.   Psychiatric/Behavioral:  Negative for confusion, decreased concentration, hallucinations and suicidal ideas.      Vitals:    07/08/24 0823   BP: 118/72   Pulse: 63   Temp: 36.7 °C (98 °F)   SpO2: 97%      Objective   Physical Exam  Constitutional:       Appearance: Normal appearance. She is obese.   Cardiovascular:      Rate and Rhythm: Normal rate and regular rhythm.      Heart sounds: Normal heart sounds.   Pulmonary:      Effort: Pulmonary effort is normal.      Breath sounds: Normal breath sounds.   Musculoskeletal:      Cervical back: Neck supple.   Skin:     General: Skin is warm and dry.   Neurological:      General: No focal deficit present.      Mental Status: She is  alert.   Psychiatric:         Mood and Affect: Mood normal.         Speech: Speech normal.         Behavior: Behavior normal.         Cognition and Memory: Cognition normal.         Assessment/Plan   There are no diagnoses linked to this encounter.

## 2024-07-08 NOTE — PATIENT INSTRUCTIONS
It was nice to see you today!  Discussed current concerns and addressed   Reviewed recent labs and diagnostics  Reviewed medications list  Continue to eat a healthy diet, exercise at least 3 times a week or more  Plan and follow up discussed  For any further information related to your condition, copy and paste or go to [email or web address]  RF phentermine  Risk and benefit discussed  Discussed caloric limit for age and gender, [employer] Angelina recommended  Exercise at least 3x a week at least 45 min each session aerobically  OARRS reviewed  CSA and UDS current/updated  Follow up 1 month

## 2024-08-08 ENCOUNTER — APPOINTMENT (OUTPATIENT)
Dept: PRIMARY CARE | Facility: CLINIC | Age: 59
End: 2024-08-08
Payer: COMMERCIAL

## 2024-08-08 VITALS
WEIGHT: 241 LBS | HEIGHT: 66 IN | HEART RATE: 67 BPM | OXYGEN SATURATION: 97 % | TEMPERATURE: 97.7 F | SYSTOLIC BLOOD PRESSURE: 110 MMHG | BODY MASS INDEX: 38.73 KG/M2 | DIASTOLIC BLOOD PRESSURE: 70 MMHG

## 2024-08-08 DIAGNOSIS — E66.9 OBESITY WITH BODY MASS INDEX 30 OR GREATER: ICD-10-CM

## 2024-08-08 PROCEDURE — 3008F BODY MASS INDEX DOCD: CPT | Performed by: FAMILY MEDICINE

## 2024-08-08 PROCEDURE — 99213 OFFICE O/P EST LOW 20 MIN: CPT | Performed by: FAMILY MEDICINE

## 2024-08-08 RX ORDER — PHENTERMINE HYDROCHLORIDE 37.5 MG/1
37.5 CAPSULE ORAL
Qty: 30 CAPSULE | Refills: 0 | Status: SHIPPED | OUTPATIENT
Start: 2024-08-08 | End: 2024-09-07

## 2024-08-08 ASSESSMENT — ENCOUNTER SYMPTOMS
VOMITING: 0
ABDOMINAL PAIN: 0
BLOOD IN STOOL: 0
SHORTNESS OF BREATH: 0
HEMATURIA: 0
DIARRHEA: 0
UNEXPECTED WEIGHT CHANGE: 0
DIZZINESS: 0
NAUSEA: 0
HALLUCINATIONS: 0
SORE THROAT: 0
DECREASED CONCENTRATION: 0
WEAKNESS: 0
FEVER: 0
EYE PAIN: 0
COUGH: 0
NUMBNESS: 0
CONFUSION: 0
FATIGUE: 0
TROUBLE SWALLOWING: 0
FREQUENCY: 0
HEADACHES: 0
DYSURIA: 0
PALPITATIONS: 0
JOINT SWELLING: 0

## 2024-08-08 ASSESSMENT — PAIN SCALES - GENERAL: PAINLEVEL: 0-NO PAIN

## 2024-08-08 NOTE — PATIENT INSTRUCTIONS
RF phentermine  Risk and benefit discussed  Discussed caloric limit for age and gender, [employer] Angelina recommended  Exercise at least 3x a week at least 45 min each session aerobically  OARRS reviewed  CSA and UDS current/updated  Follow up 1 month

## 2024-08-08 NOTE — PROGRESS NOTES
Subjective   Patient ID: Elsie Renteria is a 58 y.o. female.    Patient here to refill phentermine. She has lost weight. Understands the medication, risk and benefits. No uncontrolled BP. No hx stroke, MI. Is following a healthy diet with a caloric goal below 1200 daily. She is committed to exercising at least 3x a week for at least 40 minutes. She does not smoke. She has no CP, SOB or palpitations.          Review of Systems   Constitutional:  Negative for fatigue, fever and unexpected weight change.   HENT:  Negative for congestion, ear pain, hearing loss, sore throat and trouble swallowing.    Eyes:  Negative for pain and visual disturbance.   Respiratory:  Negative for cough and shortness of breath.    Cardiovascular:  Negative for chest pain, palpitations and leg swelling.   Gastrointestinal:  Negative for abdominal pain, blood in stool, diarrhea, nausea and vomiting.   Genitourinary:  Negative for dysuria, frequency, hematuria and urgency.   Musculoskeletal:  Negative for joint swelling.   Skin:  Negative for pallor and rash.   Neurological:  Negative for dizziness, syncope, weakness, numbness and headaches.   Psychiatric/Behavioral:  Negative for confusion, decreased concentration, hallucinations and suicidal ideas.      Vitals:    08/08/24 0824   BP: 110/70   Pulse: 67   Temp: 36.5 °C (97.7 °F)   SpO2: 97%      Objective   Physical Exam  Constitutional:       Appearance: Normal appearance. She is obese.   Cardiovascular:      Rate and Rhythm: Normal rate and regular rhythm.      Heart sounds: Normal heart sounds.   Pulmonary:      Effort: Pulmonary effort is normal.      Breath sounds: Normal breath sounds.   Musculoskeletal:      Cervical back: Neck supple.   Skin:     General: Skin is warm and dry.   Neurological:      General: No focal deficit present.      Mental Status: She is alert.   Psychiatric:         Mood and Affect: Mood normal.         Speech: Speech normal.         Behavior: Behavior normal.          Cognition and Memory: Cognition normal.         Assessment/Plan   Diagnoses and all orders for this visit:  Obesity with body mass index 30 or greater  -     phentermine 37.5 mg capsule; Take 1 capsule (37.5 mg) by mouth once daily in the morning. Take before meals. BMI 38.9

## 2024-09-09 ENCOUNTER — APPOINTMENT (OUTPATIENT)
Dept: PRIMARY CARE | Facility: CLINIC | Age: 59
End: 2024-09-09
Payer: COMMERCIAL

## 2024-09-10 ENCOUNTER — TELEMEDICINE (OUTPATIENT)
Dept: PRIMARY CARE | Facility: CLINIC | Age: 59
End: 2024-09-10
Payer: COMMERCIAL

## 2024-09-10 VITALS — BODY MASS INDEX: 37.93 KG/M2 | WEIGHT: 235 LBS

## 2024-09-10 DIAGNOSIS — E66.9 OBESITY WITH BODY MASS INDEX 30 OR GREATER: ICD-10-CM

## 2024-09-10 DIAGNOSIS — J45.990 EXERCISE-INDUCED ASTHMA (HHS-HCC): Primary | ICD-10-CM

## 2024-09-10 PROCEDURE — 99213 OFFICE O/P EST LOW 20 MIN: CPT | Performed by: FAMILY MEDICINE

## 2024-09-10 ASSESSMENT — ENCOUNTER SYMPTOMS
SORE THROAT: 0
SHORTNESS OF BREATH: 0
CONFUSION: 0
HEADACHES: 0
TROUBLE SWALLOWING: 0
VOMITING: 0
HALLUCINATIONS: 0
NAUSEA: 0
ABDOMINAL PAIN: 0
PALPITATIONS: 0
COUGH: 0
FATIGUE: 0
JOINT SWELLING: 0
DECREASED CONCENTRATION: 0
UNEXPECTED WEIGHT CHANGE: 0
NUMBNESS: 0
EYE PAIN: 0
DIZZINESS: 0
DIARRHEA: 0
FREQUENCY: 0
HEMATURIA: 0
DYSURIA: 0
BLOOD IN STOOL: 0
WEAKNESS: 0
FEVER: 0

## 2024-09-10 NOTE — PROGRESS NOTES
Subjective   Patient ID: Elsie Renteria is a 58 y.o. female.    Patient here to refill phentermine. She has lost weight. %lbs. Cycles for exercise. Did 106 miles yesterday. Understands the medication, risk and benefits. No uncontrolled BP. No hx stroke, MI. Is following a healthy diet with a caloric goal below 1200 daily. She is committed to exercising at least 3x a week for at least 40 minutes. She does not smoke. She has no CP, SOB or palpitations.  Asthma doing great on symbacort.          Review of Systems   Constitutional:  Negative for fatigue, fever and unexpected weight change.   HENT:  Negative for congestion, ear pain, hearing loss, sore throat and trouble swallowing.    Eyes:  Negative for pain and visual disturbance.   Respiratory:  Negative for cough and shortness of breath.    Cardiovascular:  Negative for chest pain, palpitations and leg swelling.   Gastrointestinal:  Negative for abdominal pain, blood in stool, diarrhea, nausea and vomiting.   Genitourinary:  Negative for dysuria, frequency, hematuria and urgency.   Musculoskeletal:  Negative for joint swelling.   Skin:  Negative for pallor and rash.   Neurological:  Negative for dizziness, syncope, weakness, numbness and headaches.   Psychiatric/Behavioral:  Negative for confusion, decreased concentration, hallucinations and suicidal ideas.      There were no vitals filed for this visit.   Objective   Physical Exam  Constitutional:       Appearance: Normal appearance. She is obese.   Neurological:      Mental Status: She is alert.         Assessment/Plan   There are no diagnoses linked to this encounter.

## 2024-09-10 NOTE — PATIENT INSTRUCTIONS
RF phentermine  Risk and benefit discussed  Discussed caloric limit for age and gender, [employer] Angelina recommended  Exercise at least 3x a week at least 45 min each session aerobically  OARRS reviewed  CSA and UDS current/updated  Follow up 1 month

## 2024-09-13 DIAGNOSIS — E66.9 OBESITY WITH BODY MASS INDEX 30 OR GREATER: ICD-10-CM

## 2024-09-13 RX ORDER — PHENTERMINE HYDROCHLORIDE 37.5 MG/1
37.5 CAPSULE ORAL
Qty: 30 CAPSULE | Refills: 0 | Status: SHIPPED | OUTPATIENT
Start: 2024-09-13 | End: 2024-10-13

## 2024-09-16 ENCOUNTER — APPOINTMENT (OUTPATIENT)
Dept: ALLERGY | Facility: CLINIC | Age: 59
End: 2024-09-16
Payer: COMMERCIAL

## 2024-10-09 ENCOUNTER — APPOINTMENT (OUTPATIENT)
Dept: PRIMARY CARE | Facility: CLINIC | Age: 59
End: 2024-10-09
Payer: COMMERCIAL

## 2024-10-09 VITALS — WEIGHT: 230 LBS | BODY MASS INDEX: 37.12 KG/M2

## 2024-10-09 DIAGNOSIS — E66.9 OBESITY WITH BODY MASS INDEX 30 OR GREATER: Primary | ICD-10-CM

## 2024-10-09 DIAGNOSIS — J45.990 EXERCISE-INDUCED ASTHMA (HHS-HCC): ICD-10-CM

## 2024-10-09 PROCEDURE — 99213 OFFICE O/P EST LOW 20 MIN: CPT | Performed by: FAMILY MEDICINE

## 2024-10-09 RX ORDER — PHENTERMINE HYDROCHLORIDE 37.5 MG/1
37.5 CAPSULE ORAL
Qty: 30 CAPSULE | Refills: 0 | Status: SHIPPED | OUTPATIENT
Start: 2024-10-09 | End: 2024-11-08

## 2024-10-09 RX ORDER — BUDESONIDE AND FORMOTEROL FUMARATE DIHYDRATE 80; 4.5 UG/1; UG/1
AEROSOL RESPIRATORY (INHALATION)
Qty: 30.6 G | Refills: 5 | Status: SHIPPED | OUTPATIENT
Start: 2024-10-09

## 2024-10-09 ASSESSMENT — ENCOUNTER SYMPTOMS
DYSURIA: 0
SHORTNESS OF BREATH: 0
FEVER: 0
HEMATURIA: 0
NAUSEA: 0
FATIGUE: 0
EYE PAIN: 0
SORE THROAT: 0
CONFUSION: 0
FREQUENCY: 0
HEADACHES: 0
DECREASED CONCENTRATION: 0
DIARRHEA: 0
DIZZINESS: 0
VOMITING: 0
PALPITATIONS: 0
WEAKNESS: 0
UNEXPECTED WEIGHT CHANGE: 0
BLOOD IN STOOL: 0
COUGH: 0
JOINT SWELLING: 0
HALLUCINATIONS: 0
TROUBLE SWALLOWING: 0
NUMBNESS: 0
ABDOMINAL PAIN: 0

## 2024-10-09 NOTE — PATIENT INSTRUCTIONS
RF phentermine  Risk and benefit discussed  Discussed caloric limit for age and gender, [employer] Angelina recommended  Exercise at least 3x a week at least 45 min each session aerobically  OARRS reviewed  CSA and UDS current/updated  Follow up 1 month

## 2024-10-09 NOTE — PROGRESS NOTES
Subjective   Patient ID: Elsie Renteria is a 58 y.o. female.    Patient here to refill phentermine. She has lost weight. Understands the medication, risk and benefits. No uncontrolled BP. No hx stroke, MI. Is following a healthy diet with a caloric goal below 1200 daily. She is committed to exercising at least 3x a week for at least 40 minutes. She does not smoke. She has no CP, SOB or palpitations.          Review of Systems   Constitutional:  Negative for fatigue, fever and unexpected weight change.   HENT:  Negative for congestion, ear pain, hearing loss, sore throat and trouble swallowing.    Eyes:  Negative for pain and visual disturbance.   Respiratory:  Negative for cough and shortness of breath.    Cardiovascular:  Negative for chest pain, palpitations and leg swelling.   Gastrointestinal:  Negative for abdominal pain, blood in stool, diarrhea, nausea and vomiting.   Genitourinary:  Negative for dysuria, frequency, hematuria and urgency.   Musculoskeletal:  Negative for joint swelling.   Skin:  Negative for pallor and rash.   Neurological:  Negative for dizziness, syncope, weakness, numbness and headaches.   Psychiatric/Behavioral:  Negative for confusion, decreased concentration, hallucinations and suicidal ideas.      There were no vitals filed for this visit.   Objective   Physical Exam  Constitutional:       Appearance: Normal appearance. She is obese.   Neurological:      Mental Status: She is alert.         Assessment/Plan   Diagnoses and all orders for this visit:  Exercise-induced asthma ([facility])  -     budesonide-formoteroL (Symbicort) 80-4.5 mcg/actuation inhaler; 2 puffs before exercise. Rinse mouth with water after use to reduce aftertaste and incidence of candidiasis. Do not swallow.  Obesity with body mass index 30 or greater  -     phentermine 37.5 mg capsule; Take 1 capsule (37.5 mg) by mouth once daily in the morning. Take before meals. BMI 37.12

## 2024-11-07 ENCOUNTER — APPOINTMENT (OUTPATIENT)
Dept: PRIMARY CARE | Facility: CLINIC | Age: 59
End: 2024-11-07
Payer: COMMERCIAL

## 2024-11-07 VITALS — BODY MASS INDEX: 36.8 KG/M2 | WEIGHT: 228 LBS

## 2024-11-07 DIAGNOSIS — F41.9 ANXIETY DISORDER, UNSPECIFIED TYPE: ICD-10-CM

## 2024-11-07 DIAGNOSIS — E66.9 OBESITY WITH BODY MASS INDEX 30 OR GREATER: ICD-10-CM

## 2024-11-07 PROCEDURE — 99213 OFFICE O/P EST LOW 20 MIN: CPT | Performed by: FAMILY MEDICINE

## 2024-11-07 RX ORDER — PHENTERMINE HYDROCHLORIDE 37.5 MG/1
37.5 CAPSULE ORAL
Qty: 30 CAPSULE | Refills: 0 | Status: SHIPPED | OUTPATIENT
Start: 2024-11-07 | End: 2024-12-07

## 2024-11-07 ASSESSMENT — ENCOUNTER SYMPTOMS
TROUBLE SWALLOWING: 0
JOINT SWELLING: 0
NAUSEA: 0
VOMITING: 0
EYE PAIN: 0
NUMBNESS: 0
HEMATURIA: 0
ABDOMINAL PAIN: 0
FATIGUE: 0
SORE THROAT: 0
DYSURIA: 0
FEVER: 0
WEAKNESS: 0
DECREASED CONCENTRATION: 0
HEADACHES: 0
BLOOD IN STOOL: 0
FREQUENCY: 0
SHORTNESS OF BREATH: 0
PALPITATIONS: 0
COUGH: 0
UNEXPECTED WEIGHT CHANGE: 0
DIARRHEA: 0
HALLUCINATIONS: 0
CONFUSION: 0
DIZZINESS: 0

## 2024-11-07 NOTE — PROGRESS NOTES
Subjective   Patient ID: Elsie Renteria is a 58 y.o. female.    Patient here to refill phentermine. She has lost weight. Understands the medication, risk and benefits. No uncontrolled BP. No hx stroke, MI. Is following a healthy diet with a caloric goal below 1200 daily. She is committed to exercising at least 3x a week for at least 40 minutes. She does not smoke. She has no CP, SOB or palpitations.          Review of Systems   Constitutional:  Negative for fatigue, fever and unexpected weight change.   HENT:  Negative for congestion, ear pain, hearing loss, sore throat and trouble swallowing.    Eyes:  Negative for pain and visual disturbance.   Respiratory:  Negative for cough and shortness of breath.    Cardiovascular:  Negative for chest pain, palpitations and leg swelling.   Gastrointestinal:  Negative for abdominal pain, blood in stool, diarrhea, nausea and vomiting.   Genitourinary:  Negative for dysuria, frequency, hematuria and urgency.   Musculoskeletal:  Negative for joint swelling.   Skin:  Negative for pallor and rash.   Neurological:  Negative for dizziness, syncope, weakness, numbness and headaches.   Psychiatric/Behavioral:  Negative for confusion, decreased concentration, hallucinations and suicidal ideas.      There were no vitals filed for this visit.   Objective   Physical Exam  Constitutional:       General: She is not in acute distress.     Appearance: She is obese. She is not toxic-appearing.   Neurological:      General: No focal deficit present.      Mental Status: She is alert and oriented to person, place, and time.   Psychiatric:         Mood and Affect: Mood normal.         Behavior: Behavior normal.         Thought Content: Thought content normal.         Judgment: Judgment normal.         Assessment/Plan   There are no diagnoses linked to this encounter.

## 2024-11-07 NOTE — PATIENT INSTRUCTIONS
RF phentermine  Risk and benefit discussed  Discussed caloric limit for age and gender, [employer] Angelina recommended  Exercise at least 3x a week at least 45 min each session aerobically  OARRS reviewed  CSA and UDS current/updated  Follow up 1 month

## 2024-12-04 ENCOUNTER — APPOINTMENT (OUTPATIENT)
Dept: PRIMARY CARE | Facility: CLINIC | Age: 59
End: 2024-12-04
Payer: COMMERCIAL

## 2024-12-04 VITALS — BODY MASS INDEX: 36.8 KG/M2 | WEIGHT: 228 LBS

## 2024-12-04 DIAGNOSIS — E66.9 OBESITY WITH BODY MASS INDEX 30 OR GREATER: ICD-10-CM

## 2024-12-04 PROCEDURE — 99213 OFFICE O/P EST LOW 20 MIN: CPT | Performed by: FAMILY MEDICINE

## 2024-12-04 RX ORDER — PHENTERMINE HYDROCHLORIDE 37.5 MG/1
37.5 CAPSULE ORAL
Qty: 30 CAPSULE | Refills: 0 | Status: SHIPPED | OUTPATIENT
Start: 2024-12-09 | End: 2025-01-08

## 2024-12-04 ASSESSMENT — ENCOUNTER SYMPTOMS
HALLUCINATIONS: 0
TROUBLE SWALLOWING: 0
COUGH: 0
HEMATURIA: 0
DECREASED CONCENTRATION: 0
FATIGUE: 0
NAUSEA: 0
CONFUSION: 0
VOMITING: 0
ABDOMINAL PAIN: 0
UNEXPECTED WEIGHT CHANGE: 0
HEADACHES: 0
JOINT SWELLING: 0
DYSURIA: 0
FEVER: 0
PALPITATIONS: 0
FREQUENCY: 0
DIZZINESS: 0
NUMBNESS: 0
SHORTNESS OF BREATH: 0
WEAKNESS: 0
BLOOD IN STOOL: 0
SORE THROAT: 0
EYE PAIN: 0
DIARRHEA: 0

## 2024-12-04 NOTE — PROGRESS NOTES
Subjective   Patient ID: Elsie Renteria is a 58 y.o. female.    Patient here to refill phentermine. She has not lost weight due to holiday. Understands the medication, risk and benefits. No uncontrolled BP. No hx stroke, MI. Is following a healthy diet with a caloric goal below 1200 daily. She is committed to exercising at least 3x a week for at least 40 minutes. She does not smoke. She has no CP, SOB or palpitations. Breathing much better on daily inhaler.          Review of Systems   Constitutional:  Negative for fatigue, fever and unexpected weight change.   HENT:  Negative for congestion, ear pain, hearing loss, sore throat and trouble swallowing.    Eyes:  Negative for pain and visual disturbance.   Respiratory:  Negative for cough and shortness of breath.    Cardiovascular:  Negative for chest pain, palpitations and leg swelling.   Gastrointestinal:  Negative for abdominal pain, blood in stool, diarrhea, nausea and vomiting.   Genitourinary:  Negative for dysuria, frequency, hematuria and urgency.   Musculoskeletal:  Negative for joint swelling.   Skin:  Negative for pallor and rash.   Neurological:  Negative for dizziness, syncope, weakness, numbness and headaches.   Psychiatric/Behavioral:  Negative for confusion, decreased concentration, hallucinations and suicidal ideas.      There were no vitals filed for this visit.   Objective   Physical Exam  Constitutional:       General: She is not in acute distress.     Appearance: She is obese. She is not toxic-appearing.   Neurological:      General: No focal deficit present.      Mental Status: She is alert and oriented to person, place, and time.   Psychiatric:         Mood and Affect: Mood normal.         Behavior: Behavior normal.         Thought Content: Thought content normal.         Judgment: Judgment normal.         Assessment/Plan   Diagnoses and all orders for this visit:  Obesity with body mass index 30 or greater  -     phentermine 37.5 mg capsule; Take 1  capsule (37.5 mg) by mouth once daily in the morning. Take before meals. BMI 36.8 Do not fill before December 9, 2024.

## 2024-12-04 NOTE — PATIENT INSTRUCTIONS
RF phentermine  Risk and benefit discussed  Discussed caloric limit for age and gender, [employer] Angelina recommended  Exercise at least 3x a week at least 45 min each session aerobically  OARRS reviewed  CSA and UDS current/updated  Follow up 1 month

## 2024-12-11 DIAGNOSIS — E66.9 OBESITY WITH BODY MASS INDEX 30 OR GREATER: ICD-10-CM

## 2024-12-11 RX ORDER — PHENTERMINE HYDROCHLORIDE 37.5 MG/1
37.5 CAPSULE ORAL
Qty: 30 CAPSULE | Refills: 0 | Status: SHIPPED | OUTPATIENT
Start: 2024-12-11 | End: 2025-01-10

## 2025-01-07 ENCOUNTER — APPOINTMENT (OUTPATIENT)
Dept: PRIMARY CARE | Facility: CLINIC | Age: 60
End: 2025-01-07
Payer: COMMERCIAL

## 2025-01-07 VITALS — WEIGHT: 232 LBS | BODY MASS INDEX: 37.45 KG/M2

## 2025-01-07 DIAGNOSIS — Z12.31 BREAST CANCER SCREENING BY MAMMOGRAM: ICD-10-CM

## 2025-01-07 DIAGNOSIS — E66.9 OBESITY WITH BODY MASS INDEX 30 OR GREATER: ICD-10-CM

## 2025-01-07 DIAGNOSIS — E66.09 CLASS 2 OBESITY DUE TO EXCESS CALORIES WITHOUT SERIOUS COMORBIDITY WITH BODY MASS INDEX (BMI) OF 37.0 TO 37.9 IN ADULT: Primary | ICD-10-CM

## 2025-01-07 DIAGNOSIS — E66.812 CLASS 2 OBESITY DUE TO EXCESS CALORIES WITHOUT SERIOUS COMORBIDITY WITH BODY MASS INDEX (BMI) OF 37.0 TO 37.9 IN ADULT: Primary | ICD-10-CM

## 2025-01-07 PROCEDURE — 99213 OFFICE O/P EST LOW 20 MIN: CPT | Performed by: FAMILY MEDICINE

## 2025-01-07 RX ORDER — PHENTERMINE HYDROCHLORIDE 37.5 MG/1
37.5 TABLET ORAL
Qty: 30 TABLET | Refills: 0 | Status: SHIPPED | OUTPATIENT
Start: 2025-01-07 | End: 2025-02-06

## 2025-01-07 ASSESSMENT — ENCOUNTER SYMPTOMS
JOINT SWELLING: 0
DYSURIA: 0
SORE THROAT: 0
FATIGUE: 0
NUMBNESS: 0
HEMATURIA: 0
COUGH: 0
ABDOMINAL PAIN: 0
SHORTNESS OF BREATH: 0
HALLUCINATIONS: 0
UNEXPECTED WEIGHT CHANGE: 0
DECREASED CONCENTRATION: 0
DIARRHEA: 0
NAUSEA: 0
FREQUENCY: 0
HEADACHES: 0
BLOOD IN STOOL: 0
WEAKNESS: 0
CONFUSION: 0
FEVER: 0
PALPITATIONS: 0
EYE PAIN: 0
VOMITING: 0
TROUBLE SWALLOWING: 0
DIZZINESS: 0

## 2025-01-07 NOTE — PATIENT INSTRUCTIONS
RF phentermine  Risk and benefit discussed  Discussed caloric limit for age and gender, [employer] Angelina recommended  Exercise at least 3x a week at least 45 min each session aerobically  OARRS reviewed  CSA and UDS current/updated  Follow up 1 month

## 2025-01-07 NOTE — PROGRESS NOTES
Subjective   Patient ID: Elsie Renteria is a 59 y.o. female.    Patient here to refill phentermine. Body mass index is 37.45 kg/m². She has not lost weight due to holiday season. Is committed to restarting her exercise and committing to a diet. Is enrolled in nutrition class. Understands the medication, risk and benefits. No uncontrolled BP. No hx stroke, MI. Is following a healthy diet with a caloric goal below 1200 daily. She is committed to exercising at least 3x a week for at least 40 minutes. She does not smoke. She has no CP, SOB or palpitations.          Review of Systems   Constitutional:  Negative for fatigue, fever and unexpected weight change.   HENT:  Negative for congestion, ear pain, hearing loss, sore throat and trouble swallowing.    Eyes:  Negative for pain and visual disturbance.   Respiratory:  Negative for cough and shortness of breath.    Cardiovascular:  Negative for chest pain, palpitations and leg swelling.   Gastrointestinal:  Negative for abdominal pain, blood in stool, diarrhea, nausea and vomiting.   Genitourinary:  Negative for dysuria, frequency, hematuria and urgency.   Musculoskeletal:  Negative for joint swelling.   Skin:  Negative for pallor and rash.   Neurological:  Negative for dizziness, syncope, weakness, numbness and headaches.   Psychiatric/Behavioral:  Negative for confusion, decreased concentration, hallucinations and suicidal ideas.      There were no vitals filed for this visit.   Objective   Physical Exam  Constitutional:       General: She is not in acute distress.     Appearance: She is obese. She is not toxic-appearing.   Neurological:      General: No focal deficit present.      Mental Status: She is alert and oriented to person, place, and time.   Psychiatric:         Mood and Affect: Mood normal.         Behavior: Behavior normal.         Thought Content: Thought content normal.         Judgment: Judgment normal.         Assessment/Plan   Diagnoses and all orders for  this visit:  Class 2 obesity due to excess calories without serious comorbidity with body mass index (BMI) of 37.0 to 37.9 in adult  Obesity with body mass index 30 or greater  -     phentermine (Adipex-P) 37.5 mg tablet; Take 1 tablet (37.5 mg) by mouth once daily in the morning. Take before meals. BMI 37.45  Breast cancer screening by mammogram  -     BI mammo bilateral screening tomosynthesis; Future

## 2025-02-11 DIAGNOSIS — J45.990 EXERCISE-INDUCED ASTHMA (HHS-HCC): ICD-10-CM

## 2025-02-11 RX ORDER — BUDESONIDE AND FORMOTEROL FUMARATE DIHYDRATE 160; 4.5 UG/1; UG/1
2 AEROSOL RESPIRATORY (INHALATION)
Qty: 30.6 G | Refills: 3 | Status: SHIPPED | OUTPATIENT
Start: 2025-02-11 | End: 2026-02-11

## 2025-02-26 ENCOUNTER — HOSPITAL ENCOUNTER (OUTPATIENT)
Dept: RADIOLOGY | Facility: CLINIC | Age: 60
Discharge: HOME | End: 2025-02-26
Payer: COMMERCIAL

## 2025-02-26 VITALS — HEIGHT: 66 IN | BODY MASS INDEX: 37.61 KG/M2 | WEIGHT: 234 LBS

## 2025-02-26 DIAGNOSIS — Z12.31 BREAST CANCER SCREENING BY MAMMOGRAM: ICD-10-CM

## 2025-02-26 PROCEDURE — 77063 BREAST TOMOSYNTHESIS BI: CPT | Performed by: RADIOLOGY

## 2025-02-26 PROCEDURE — 77067 SCR MAMMO BI INCL CAD: CPT | Performed by: RADIOLOGY

## 2025-02-26 PROCEDURE — 77067 SCR MAMMO BI INCL CAD: CPT

## 2025-03-04 ENCOUNTER — APPOINTMENT (OUTPATIENT)
Dept: PRIMARY CARE | Facility: CLINIC | Age: 60
End: 2025-03-04
Payer: COMMERCIAL

## 2025-03-04 VITALS
HEART RATE: 76 BPM | BODY MASS INDEX: 38.41 KG/M2 | WEIGHT: 238 LBS | DIASTOLIC BLOOD PRESSURE: 60 MMHG | SYSTOLIC BLOOD PRESSURE: 100 MMHG | TEMPERATURE: 97.3 F | OXYGEN SATURATION: 97 %

## 2025-03-04 DIAGNOSIS — F41.9 ANXIETY DISORDER, UNSPECIFIED TYPE: ICD-10-CM

## 2025-03-04 DIAGNOSIS — M25.572 ACUTE LEFT ANKLE PAIN: Primary | ICD-10-CM

## 2025-03-04 DIAGNOSIS — E66.9 OBESITY WITH BODY MASS INDEX 30 OR GREATER: ICD-10-CM

## 2025-03-04 DIAGNOSIS — S93.409A GRADE 1 ANKLE SPRAIN: ICD-10-CM

## 2025-03-04 DIAGNOSIS — J01.80 OTHER ACUTE SINUSITIS, RECURRENCE NOT SPECIFIED: ICD-10-CM

## 2025-03-04 DIAGNOSIS — J45.990 EXERCISE-INDUCED ASTHMA (HHS-HCC): ICD-10-CM

## 2025-03-04 PROBLEM — J01.90 ACUTE SINUSITIS: Status: ACTIVE | Noted: 2025-03-04

## 2025-03-04 PROCEDURE — 99213 OFFICE O/P EST LOW 20 MIN: CPT | Performed by: FAMILY MEDICINE

## 2025-03-04 ASSESSMENT — ENCOUNTER SYMPTOMS
JOINT SWELLING: 1
ARTHRALGIAS: 1
NECK STIFFNESS: 0
HEADACHES: 0
CHEST TIGHTNESS: 0
SHORTNESS OF BREATH: 0
RHINORRHEA: 1
TROUBLE SWALLOWING: 0
SORE THROAT: 1
WOUND: 0
FEVER: 0
WHEEZING: 0
PALPITATIONS: 0
COUGH: 0
FATIGUE: 1
WEAKNESS: 0
COLOR CHANGE: 0
SINUS PAIN: 1
DIZZINESS: 0
SINUS PRESSURE: 1
NECK PAIN: 0
CHILLS: 0

## 2025-03-04 ASSESSMENT — PAIN SCALES - GENERAL: PAINLEVEL_OUTOF10: 3

## 2025-03-04 NOTE — PROGRESS NOTES
"Subjective   Patient ID: Elsie Renteria is a 59 y.o. female.    HPI    Review of Systems  Vitals:    03/04/25 0839   BP: 100/60   Pulse: 76   Temp: 36.3 °C (97.3 °F)   SpO2: 97%      Body mass index is 38.41 kg/m².  Objective   Physical Exam    Last Labs:     CMP: No results found for: \"CALCIUM\", \"PHOS\", \"PROT\", \"ALBUMIN\", \"AST\", \"ALKPHOS\", \"BILITOT\"  CBC:   Lab Results   Component Value Date    WBC 5.7 04/24/2024    HGB 13.9 04/24/2024    HCT 43.4 04/24/2024    MCV 95 04/24/2024     04/24/2024     A1C:   No results found for: \"HGBA1C\"  LIPID PANEL:   No results found for: \"CHOL\", \"TRIG\", \"HDL\", \"CHHDL\", \"LDLF\", \"VLDL\", \"NHDL\"  TSH:   No results found for: \"TSH\", \"U2MDLWL\"  PSA:   No results found for: \"PSA\"     Assessment/Plan   Diagnoses and all orders for this visit:  Acute left ankle pain  -     XR ankle left 2 views; Future      "

## 2025-03-04 NOTE — PROGRESS NOTES
Subjective   Patient ID: Elsie Renteria is a 59 y.o. female who presents for left ankle (Since last week) and URI (Congestion since saturday).    Elsie is a 59 year-old female presenting today for left ankle pain and swelling. Per patient, the pain initially began about 6 years ago. X-rays were performed to rule-out fractures, which were negative, and she began PT and working with a . Her symptoms have never completely gone away but were manageable. Within the past 3 weeks, her job was very demanding, requiring her to be on her feet all day. The constant impact on the concrete floor exacerbated her symptoms. She still works with her , but was taking ibuprofen q6-8h during this time, elevating her foot, and using ice to minimal relief. The pain and swelling near her ankle have improved throughout the last week as the demands of her job have lightened. We discussed using Voltaren gel for topical pain and inflammation relief, using an ankle brace to help the stability and swelling, investing in supportive shoes with good padding, and performing ankle mobility and strengthening exercises. An X-ray was also ordered to rule-out fractures. The patient also said over the past 3 days she has begun to experience nasal pressure and congestion with clear drainage and fatigue. Her symptoms are likely viral related. She has no fever, green sputum, dyspnea, chest pain, wheezing, night sweats, or chills. Symptomatic care, including increasing fluids, using humidification, increased rest, and OTC decongestants as needed were discussed.     Review of Systems   Constitutional:  Positive for fatigue. Negative for chills and fever.   HENT:  Positive for congestion, rhinorrhea, sinus pressure, sinus pain and sore throat. Negative for trouble swallowing.    Eyes:  Negative for visual disturbance.   Respiratory:  Negative for cough, chest tightness, shortness of breath and wheezing.    Cardiovascular:  Negative  for chest pain, palpitations and leg swelling.   Musculoskeletal:  Positive for arthralgias and joint swelling. Negative for neck pain and neck stiffness.   Skin:  Negative for color change, pallor, rash and wound.   Neurological:  Negative for dizziness, weakness and headaches.       Objective   /60   Pulse 76   Temp 36.3 °C (97.3 °F)   Wt 108 kg (238 lb)   SpO2 97%   BMI 38.41 kg/m²     Physical Exam  Constitutional:       General: She is not in acute distress.     Appearance: Normal appearance.   HENT:      Right Ear: Tympanic membrane normal.      Left Ear: Tympanic membrane normal.      Nose: Congestion and rhinorrhea present.      Mouth/Throat:      Mouth: Mucous membranes are moist.      Pharynx: Oropharynx is clear.   Eyes:      Pupils: Pupils are equal, round, and reactive to light.   Cardiovascular:      Rate and Rhythm: Normal rate and regular rhythm.      Pulses: Normal pulses.      Heart sounds: Normal heart sounds. No murmur heard.     No friction rub. No gallop.   Pulmonary:      Effort: Pulmonary effort is normal. No respiratory distress.      Breath sounds: Normal breath sounds. No stridor. No wheezing or rhonchi.      Comments: Vesicular breath sounds; all lobes clear to auscultation.   Musculoskeletal:         General: Swelling and tenderness present.      Cervical back: Normal range of motion and neck supple.      Comments: +1 pitting edema around bilateral ankles   Skin:     General: Skin is warm and dry.      Capillary Refill: Capillary refill takes less than 2 seconds.      Findings: No bruising, erythema, lesion or rash.   Neurological:      General: No focal deficit present.      Mental Status: She is alert and oriented to person, place, and time.       Assessment/Plan   Diagnoses and all orders for this visit:  Acute left ankle pain  -     XR ankle left 2 views; Future  -     Apply voltaren gel to ankle for pain and inflammation control  -     Obtain an ankle brace and supportive  shoes for support and to decrease swelling  -     Perform ankle stretches and exercises as provided  -     Continue working with    Obesity with body mass index 30 or greater  Grade 1 ankle sprain  Exercise-induced asthma (HHS-HCC)  Anxiety disorder, unspecified type  Other acute sinusitis, recurrence not specified        -     Provide symptomatic care at home: Rest, humidification, increased fluids, and nasal decongestants as needed

## 2025-03-04 NOTE — PATIENT INSTRUCTIONS
Get brace  Use voltaren gel  Get xray to r/o stress fracture  If no better consider injection  Do exercises

## 2025-03-10 ENCOUNTER — HOSPITAL ENCOUNTER (OUTPATIENT)
Dept: RADIOLOGY | Facility: CLINIC | Age: 60
Discharge: HOME | End: 2025-03-10
Payer: COMMERCIAL

## 2025-03-10 DIAGNOSIS — M25.572 ACUTE LEFT ANKLE PAIN: ICD-10-CM

## 2025-03-10 PROCEDURE — 73600 X-RAY EXAM OF ANKLE: CPT | Mod: LT

## 2025-04-10 DIAGNOSIS — F41.9 ANXIETY DISORDER, UNSPECIFIED TYPE: ICD-10-CM

## 2025-04-10 RX ORDER — VENLAFAXINE HCL 37.5 MG
CAPSULE, EXT RELEASE 24 HR ORAL
Qty: 90 CAPSULE | Refills: 3 | Status: SHIPPED | OUTPATIENT
Start: 2025-04-10

## 2025-05-06 DIAGNOSIS — M25.572 LEFT ANKLE PAIN, UNSPECIFIED CHRONICITY: ICD-10-CM

## 2025-06-10 ENCOUNTER — APPOINTMENT (OUTPATIENT)
Dept: PRIMARY CARE | Facility: CLINIC | Age: 60
End: 2025-06-10
Payer: COMMERCIAL

## 2025-06-10 VITALS
DIASTOLIC BLOOD PRESSURE: 80 MMHG | SYSTOLIC BLOOD PRESSURE: 130 MMHG | HEART RATE: 73 BPM | BODY MASS INDEX: 40.35 KG/M2 | OXYGEN SATURATION: 96 % | WEIGHT: 250 LBS | TEMPERATURE: 94.6 F

## 2025-06-10 DIAGNOSIS — G89.29 CHRONIC PAIN OF LEFT ANKLE: Primary | ICD-10-CM

## 2025-06-10 DIAGNOSIS — M25.572 CHRONIC PAIN OF LEFT ANKLE: Primary | ICD-10-CM

## 2025-06-10 PROCEDURE — 99213 OFFICE O/P EST LOW 20 MIN: CPT | Performed by: FAMILY MEDICINE

## 2025-06-10 ASSESSMENT — ENCOUNTER SYMPTOMS
TREMORS: 0
ABDOMINAL PAIN: 0
PALPITATIONS: 0
COUGH: 0
TROUBLE SWALLOWING: 0
SINUS PAIN: 0
BRUISES/BLEEDS EASILY: 0
FATIGUE: 0
NAUSEA: 0
JOINT SWELLING: 0
DYSURIA: 0
UNEXPECTED WEIGHT CHANGE: 0
BLOOD IN STOOL: 0
DIARRHEA: 0
SHORTNESS OF BREATH: 0
VOMITING: 0
WEAKNESS: 0
LIGHT-HEADEDNESS: 0
FEVER: 0
DYSPHORIC MOOD: 0
ARTHRALGIAS: 1
HEMATURIA: 0

## 2025-06-10 ASSESSMENT — PAIN SCALES - GENERAL: PAINLEVEL_OUTOF10: 3

## 2025-06-10 NOTE — PROGRESS NOTES
Subjective   Patient ID: Elsie Renteria is a 59 y.o. female.    Patient is an avid biker and hiker.  She has chronic left ankle pain from strain 3 months ago.  X-ray was unremarkable.  She has just started physical therapy.  She is been icing it and trying to rest it but it is not getting better.  It is starting to affect her gait and she is now having right leg and thigh pain.        Review of Systems   Constitutional:  Negative for fatigue, fever and unexpected weight change.   HENT:  Negative for congestion, ear pain, nosebleeds, sinus pain and trouble swallowing.    Eyes:  Negative for visual disturbance.   Respiratory:  Negative for cough and shortness of breath.    Cardiovascular:  Negative for chest pain and palpitations.   Gastrointestinal:  Negative for abdominal pain, blood in stool, diarrhea, nausea and vomiting.   Genitourinary:  Negative for dysuria and hematuria.   Musculoskeletal:  Positive for arthralgias. Negative for gait problem and joint swelling.   Neurological:  Negative for tremors, weakness and light-headedness.   Hematological:  Does not bruise/bleed easily.   Psychiatric/Behavioral:  Negative for dysphoric mood and suicidal ideas.      Vitals:    06/10/25 0905   BP: 130/80   Pulse: 73   Temp: 34.8 °C (94.6 °F)   SpO2: 96%      Body mass index is 40.35 kg/m².  Objective   Physical Exam  Constitutional:       General: She is not in acute distress.     Appearance: She is obese. She is not toxic-appearing.   Musculoskeletal:      Comments: Left ankle is a little swollen.  There is tenderness and swelling anterior to the left medial malleolus.  There is good range of motion.  Does not appear to be any dislocation of the joint.  Skin is warm with good capillary refill.   Neurological:      General: No focal deficit present.      Mental Status: She is alert and oriented to person, place, and time.   Psychiatric:         Mood and Affect: Mood normal.         Behavior: Behavior normal.         Thought  "Content: Thought content normal.         Judgment: Judgment normal.         Last Labs:     CMP: No results found for: \"CALCIUM\", \"PHOS\", \"PROT\", \"ALBUMIN\", \"AST\", \"ALKPHOS\", \"BILITOT\"  CBC:   Lab Results   Component Value Date    WBC 5.7 04/24/2024    HGB 13.9 04/24/2024    HCT 43.4 04/24/2024    MCV 95 04/24/2024     04/24/2024     A1C:   No results found for: \"HGBA1C\"  LIPID PANEL:   No results found for: \"CHOL\", \"TRIG\", \"HDL\", \"CHHDL\", \"LDLF\", \"VLDL\", \"NHDL\"  TSH:   No results found for: \"TSH\", \"W7SMWDL\"  PSA:   No results found for: \"PSA\"     Assessment/Plan   There are no diagnoses linked to this encounter.    "

## 2025-06-10 NOTE — PATIENT INSTRUCTIONS
Discussed continuing with physical therapy and most likely will need an MRI.  We can then get her to an ankle specialist.  Try to go easy on it, rest it, ice it and can even try a boot.

## 2025-06-12 ENCOUNTER — EVALUATION (OUTPATIENT)
Dept: PHYSICAL THERAPY | Facility: CLINIC | Age: 60
End: 2025-06-12
Payer: COMMERCIAL

## 2025-06-12 DIAGNOSIS — Z12.12 SCREENING FOR COLORECTAL CANCER: ICD-10-CM

## 2025-06-12 DIAGNOSIS — Z12.11 SCREENING FOR COLORECTAL CANCER: ICD-10-CM

## 2025-06-12 DIAGNOSIS — M25.551 RIGHT HIP PAIN: ICD-10-CM

## 2025-06-12 DIAGNOSIS — M25.572 LEFT ANKLE PAIN, UNSPECIFIED CHRONICITY: Primary | ICD-10-CM

## 2025-06-12 PROCEDURE — 97161 PT EVAL LOW COMPLEX 20 MIN: CPT | Mod: GP

## 2025-06-12 PROCEDURE — 97110 THERAPEUTIC EXERCISES: CPT | Mod: GP

## 2025-06-12 NOTE — PROGRESS NOTES
Physical Therapy Evaluation    Patient Name: Elsie Renteria  MRN: [id number]  Today's Date: 6/12/2025  Time Calculation  Start Time: 1100  Stop Time: 1149  Time Calculation (min): 49 min  PT Evaluation Time Entry  PT Evaluation (Low) Time Entry: 25  PT Therapeutic Procedures Time Entry  Therapeutic Exercise Time Entry: 20        Insurance: [employer], 40 visits  Plan of Care: 6/12/25 to 9/10/25  Visit #1    Referring MD Renee Velásquez  Imaging Performed FINDINGS:  No acute fracture or dislocation identified on two view exam. The  ankle joint space appears preserved. Plantar calcaneal spur. Midfoot  degenerative changes.  IMPRESSION:  No acute osseous findings of the left ankle.    Assessment     Elsie Renteria is a 59 y.o. referred for L ankle pain. Patient demonstrates decreased inversion and plantarflexion strength on L, decreased L calf flexibility, decreased R glute strength, altered gait mechanics, and pain. At this time, patient is limited with walking, standing, stairs, exercising, and walking barefoot. Sleeping can also be affected. Patient will benefit from physical therapy services to address stated impairments and improve functional mobility.    Plan  Treatment/Interventions: Cryotherapy, Education/ Instruction, Gait training, Electrical stimulation, Dry needling, Hot pack, Manual therapy, Neuromuscular re-education, Self care/ home management, Therapeutic activities, Therapeutic exercises  PT Plan: Skilled PT  PT Frequency:  (every other week)  Duration: 6-8 visits  Onset Date: 02/01/25  Certification Period Start Date: 06/12/25  Certification Period End Date: 09/10/25  Number of Treatments Authorized: 40  Rehab Potential: Good  Plan of Care Agreement: Patient    Primary diagnosis: Left ankle pain  Current Problem  1. Left ankle pain, unspecified chronicity  Referral to Physical Therapy    Follow Up In Physical Therapy      2. 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 No    Function:   Work/Recreation: , works with theater; exercises with a  with modifications   Prior Level: Full prior to February   Current limitations: walking on hard surfaces, hiking, walking barefoot, some exercises   Condition: Unchanged/Improving      Home Situation: House   Stairs: yes   No concerns about home set up    Any falls in the past year No     Injuries? N/a    Fear of falling? No    Sleep:    Getting to sleep Yes, every other night   Disturbed Yes, every other night   Preferred position(s): back, but would prefer to lay on side     Goals for Therapy:    Address pain and tightness    Objective   Palpation: TTP inferior portion medial malleolus, none in post tib region  + pain SL calf raise starting on first rep     ROM/Flexibility:    Ankle DF R / L : 0 / (10)                Ankle PF R / L :      50 / 50             Ankle INV R / L : 35 / 40, more pain med mall         Ankle EV R / L : 20 / 20, some pain med mall        Gastroc R  / L : 0 / (10)    Soleus R / L: 15 / 0     Girth Measurements/Swelling :    Figure 8  R / L : 37 / 37.5 cm         Strength R / L :    Hip Flexion:     5 / 5    Hip Extension:     4 / 4+     Glute Med:       4 / 5    Glute Min:      4 / 5    Hip Adduction:    5 / 5    Knee Extension:   5 / 5    Knee Flexion:       5 / 5    Ankle DF:             5 / 5    Ankle PF:              5 / 4, pain on first SL calf raise rep; no pain in gravity eliminated    Ankle INV:      5 / 4, pain    Ankle EV:     5 / 5     Neurological: Pt endorses intact/symmetrical BLE sensation     Gait: Decreased L stance time, mild increased pronation on L compared to R     Balance: SLS 30 sec R/L, + pain on L in ankle     Special Tests R / L :     Anterior Drawer:    - / -    Talar tilt lateral:      - / -     Talar tilt medial:     - / -      Outcome Measure:    LEFS : 52 / 80      Treatment:  Therapeutic Exercise   Gastroc stretch 20 sec x 2 L   Soleus stretch 20 sec x 2 L   Seated L inversion iso 30 sec x 2  - some pain with 3rd, advised her to start without band   Seated calf raise w/ball squeeze x 20   Towel scrunch x 20 L    Goals:  Active       PT Problem       Pt will increase L foot inversion to 5/5 to facilitate improved arch support from posterior tibialis.        Start:  06/12/25    Expected End:  07/27/25            Pt will be able to maintain SLS for 30 sec on BLE without pain.        Start:  06/12/25    Expected End:  07/27/25            Pt will increase R glute strength to 5/5 to facilitate improved ability to perform SL activities without R hip pain.       Start:  06/12/25    Expected End:  08/26/25            Pt will increase L calf flexibility to neutral or better to facilitate improved gait mechanics and joint mobility.        Start:  06/12/25    Expected End:  08/26/25

## 2025-06-13 ENCOUNTER — TELEPHONE (OUTPATIENT)
Dept: PRIMARY CARE | Facility: CLINIC | Age: 60
End: 2025-06-13
Payer: COMMERCIAL

## 2025-06-13 NOTE — TELEPHONE ENCOUNTER
[facility] pre-cert called stating a MRI of left ankle was ordered but insurance denied and requested a peer to peer. They sent info regarding peer to peer request to email and in-basket.    [facility] pre-cert ph: [phone]  [facility] reviewer ph:[phone]  Case #: [id number]  ID:[id number]

## 2025-06-17 NOTE — TELEPHONE ENCOUNTER
Pre-cert calling again regarding the peer to peer. Pt MRI is scheduled for June 23rd.  Are you doing Peer to peer     PA review number: [phone]  Tracking number: [id number]    Pre-cert number: [phone]

## 2025-06-19 NOTE — TELEPHONE ENCOUNTER
Left message for precert department. Attempted to schedule peer to peer with number given below. Number states only for [employer], after 12/2024 they will no longer do commercial requests. I need information to schedule peer to peer. Asked the information be either forwarded, or a call back with another number to schedule. Gave our office number if needed to speak with us.

## 2025-06-23 ENCOUNTER — HOSPITAL ENCOUNTER (OUTPATIENT)
Dept: RADIOLOGY | Facility: CLINIC | Age: 60
End: 2025-06-23
Payer: COMMERCIAL

## 2025-06-23 DIAGNOSIS — M25.572 CHRONIC PAIN OF LEFT ANKLE: ICD-10-CM

## 2025-06-23 DIAGNOSIS — G89.29 CHRONIC PAIN OF LEFT ANKLE: ICD-10-CM

## 2025-06-23 PROCEDURE — 73721 MRI JNT OF LWR EXTRE W/O DYE: CPT | Mod: LEFT SIDE | Performed by: STUDENT IN AN ORGANIZED HEALTH CARE EDUCATION/TRAINING PROGRAM

## 2025-06-23 PROCEDURE — 73721 MRI JNT OF LWR EXTRE W/O DYE: CPT | Mod: LT

## 2025-06-25 NOTE — TELEPHONE ENCOUNTER
Left second message for precert department trying to get correct number to set up peer to peer. If they can fax the denial so we can do peer to peer. Number I was given is only for [employer], not commercial patients.

## 2025-06-27 DIAGNOSIS — S96.919S: Primary | ICD-10-CM

## 2025-07-09 ENCOUNTER — APPOINTMENT (OUTPATIENT)
Dept: PHYSICAL THERAPY | Facility: CLINIC | Age: 60
End: 2025-07-09
Payer: COMMERCIAL

## 2025-07-09 DIAGNOSIS — M25.551 RIGHT HIP PAIN: ICD-10-CM

## 2025-07-09 DIAGNOSIS — M25.572 LEFT ANKLE PAIN, UNSPECIFIED CHRONICITY: ICD-10-CM

## 2025-07-09 PROCEDURE — 97110 THERAPEUTIC EXERCISES: CPT | Mod: GP

## 2025-07-09 PROCEDURE — 97140 MANUAL THERAPY 1/> REGIONS: CPT | Mod: GP

## 2025-07-09 NOTE — PROGRESS NOTES
Physical Therapy    Physical Therapy Treatment    Patient Name: Elsie Renteria  MRN: [id number]  Today's Date: 7/9/2025    Time Entry:   Time Calculation  Start Time: 1002  Stop Time: 1049  Time Calculation (min): 47 min     PT Therapeutic Procedures Time Entry  Therapeutic Exercise Time Entry: 30  Manual Therapy Time Entry: 15                   Assessment:   Went over some additional NWB exercises that she could do in case she gets placed in a walking boot after seeing ortho. No issues with these. Improved tolerance for inversion iso's compared to initial eval.   Plan:   Progress per MD recommendation    Current Problem  1. Left ankle pain, unspecified chronicity  Follow Up In Physical Therapy      2. Right hip pain  Follow Up In Physical Therapy          Subjective   Got an MRI, seeing ortho tomorrow. Most of the pain is still medial ankle, gets sore after activity even cycling.   Precautions   None  Pain   0-1/10 pain     Objective       Treatments:  Therapeutic Exercise   Tband PF / DF / EV x 20 ea (blue)   Towel scrunch x 30 L   Toe yoga x 20   Toe spreading x 10   Seated calf raise w/ball squeeze 3 x 10   Seated inversion isometric 45 sec x 5 rounds, 30 sec rest between   Bridge 5 sec x 10, VC's for toes up   Clamshell 5 sec x 20 R    OP EDUCATION:       Goals:  Active       PT Problem       Pt will increase L foot inversion to 5/5 to facilitate improved arch support from posterior tibialis.        Start:  06/12/25    Expected End:  07/27/25            Pt will be able to maintain SLS for 30 sec on BLE without pain.        Start:  06/12/25    Expected End:  07/27/25            Pt will increase R glute strength to 5/5 to facilitate improved ability to perform SL activities without R hip pain.       Start:  06/12/25    Expected End:  08/26/25            Pt will increase L calf flexibility to neutral or better to facilitate improved gait mechanics and joint mobility.        Start:  06/12/25    Expected End:  08/26/25

## 2025-07-10 ENCOUNTER — OFFICE VISIT (OUTPATIENT)
Dept: ORTHOPEDIC SURGERY | Facility: HOSPITAL | Age: 60
End: 2025-07-10
Payer: COMMERCIAL

## 2025-07-10 DIAGNOSIS — M25.572 CHRONIC PAIN OF LEFT ANKLE: ICD-10-CM

## 2025-07-10 DIAGNOSIS — M76.829 POSTERIOR TIBIAL TENDON DYSFUNCTION: Primary | ICD-10-CM

## 2025-07-10 DIAGNOSIS — G89.29 CHRONIC PAIN OF LEFT ANKLE: ICD-10-CM

## 2025-07-10 PROCEDURE — 99204 OFFICE O/P NEW MOD 45 MIN: CPT | Performed by: ORTHOPAEDIC SURGERY

## 2025-07-10 PROCEDURE — L4361 PNEUMA/VAC WALK BOOT PRE OTS: HCPCS | Performed by: ORTHOPAEDIC SURGERY

## 2025-07-10 PROCEDURE — 99203 OFFICE O/P NEW LOW 30 MIN: CPT | Performed by: ORTHOPAEDIC SURGERY

## 2025-07-10 RX ORDER — MELOXICAM 15 MG/1
15 TABLET ORAL DAILY
Qty: 30 TABLET | Refills: 3 | Status: SHIPPED | OUTPATIENT
Start: 2025-07-10 | End: 2025-11-07

## 2025-07-10 ASSESSMENT — PAIN - FUNCTIONAL ASSESSMENT: PAIN_FUNCTIONAL_ASSESSMENT: 0-10

## 2025-07-10 ASSESSMENT — PAIN SCALES - GENERAL: PAINLEVEL_OUTOF10: 2

## 2025-07-10 NOTE — PROGRESS NOTES
"Elsie Renteria    CHIEF COMPLAINT:  Left ankle pain      HISTORY OF PRESENT ILLNESS:  This is a 59 y.o. female who presents today with medial ankle pain. Has been there for a few months.  Started PT.   Intermittent ankle pain x 4 months.       Occupation: Teacher  Nicotine (Smoking/Vaping) History: non-smoker  Personal or Family Hx of DVT/PE: No  Metal Allergy: No  Diabetic:   No  Last Hgba1c:   No results found for: \"HGBA1C\"    Assessment/Plan:  1. Chronic pain of left ankle  Left ankle and foot x-rays ordered and reviewed, left ankle MRI reviewed  - Referral to Orthopedics and Sports Medicine  - XR ankle left 3+ views; Future  - XR foot left 3+ views; Future  - meloxicam (Mobic) 15 mg tablet; Take 1 tablet (15 mg) by mouth once daily.  Dispense: 30 tablet; Refill: 3    2. Posterior tibial tendon dysfunction (Primary)  - XR ankle left 3+ views; Future  - XR foot left 3+ views; Future  - meloxicam (Mobic) 15 mg tablet; Take 1 tablet (15 mg) by mouth once daily.  Dispense: 30 tablet; Refill: 3    Elsie Renteria and I discussed their diagnosis of posterior tibial tendonitis. We discussed that often this will get better with non-operative treatment. We discussed the first line of treatment is immobilization, orthotics, PT, and NSAIDs. We discussed that if that does not work, they can attempt cast immobilization. We discussed orthotics can range from OTC inserts to custom orthotics, or an AFO.  We discussed that if all these measures don't work, then we can discuss surgery in the form of a flatfoot reconstruction.     Today we will go ahead with a boot, OTC orthotics, PT and NSAIDs. They will follow up in 6-8 weeks to discuss how they are doing.      Thank you for the opportunity to participate in this patient's care.    Physical Exam:  Well appearing female in no acute distress; Alert and oriented.  Right Lower Extremity:   Grossly intact ROM and strength, no obvious deformity.  Left  Lower Extremity:  Gait Cycle:  " Antalgic  Inspection:  Swelling: Yes Redness: No  Ecchymosis: No Effusion: No   Alignment: valgus heel and pes planus deformity  Pain on palpation:  Posterior tibial tendon;   ROM: normal  Strength: normal  Specialized Tests:   Single Leg Heel Rise: unable to perform  Neurologic Status:  Sensation to all 4 compartments of lower extremity are grossly intact to light touch today in the office.  Vascular Status:   Tibialis posterior pulse: present  Dorsalis pedis pulse: present  Skin:  Normal       IMAGING:     Imaging was ordered today. Final results and radiologist's interpretation, available in the [facility] health record. Images were reviewed with the patient/family members in the office today. My personal interpretation of the performed imaging is pes planus; MRI with tenosynovitis and degenerative tearing of the PTT    Tish Monsivais MD

## 2025-07-23 ENCOUNTER — TREATMENT (OUTPATIENT)
Dept: PHYSICAL THERAPY | Facility: CLINIC | Age: 60
End: 2025-07-23
Payer: COMMERCIAL

## 2025-07-23 DIAGNOSIS — M25.551 RIGHT HIP PAIN: ICD-10-CM

## 2025-07-23 DIAGNOSIS — M25.572 LEFT ANKLE PAIN, UNSPECIFIED CHRONICITY: ICD-10-CM

## 2025-07-23 PROCEDURE — 97110 THERAPEUTIC EXERCISES: CPT | Mod: GP

## 2025-07-23 NOTE — PROGRESS NOTES
Physical Therapy    Physical Therapy Treatment    Patient Name: Elsie Renteria  MRN: [id number]  Today's Date: 7/23/2025    Time Entry:   Time Calculation  Start Time: 0916  Stop Time: 1002  Time Calculation (min): 46 min     PT Therapeutic Procedures Time Entry  Therapeutic Exercise Time Entry: 46                 Visit Count: 3    Assessment:   Discussed not doing weightbearing activity with her  out of the boot while she is supposed to be in it for the next few weeks. Tolerated all exercises well with no pain, able to progress from isometrics to isotonics with focus on eccentric lowering for ankle inversion.   Plan:   Discuss weaning out of the boot    Current Problem  1. Left ankle pain, unspecified chronicity  Follow Up In Physical Therapy      2. Right hip pain  Follow Up In Physical Therapy          Subjective   Pain is improving, only getting to a 1-2/10. Doing some standing work without the boot on and that causes soreness.   Precautions   None  Pain   0/10 initially    Objective   Mild TTP distal L post tib    Treatments:  Therapeutic Exercise   Toe yoga x 20 L    Toe spreading x 10 L   Towel scrunch x 30 L   Great toe flexion into RTB x 20 L    Lesser toe flexion into RTB x 20 L    Seated inversion L x 20 (YTB), focus on eccentric   Seated calf raises 10# x 30   Tband PF / DF / EV x 20 ea (blue)   Bridge 5 sec x 20, VC's for toes up; pt orange thick tband   Clamshell 5 sec x 20 R; pt red band    SLR 2# 5 sec x 20 L   Seated BAPS board L2 x 10 ea AP, lateral, CW/CCW circles   Supine HS stretch 2 x 30 sec ea   Supine gastroc stretch 2 x 30 sec L   Long sitting soleus stretch 2 x 30 sec L    Ice massage to distal L post tib x 5 min      OP EDUCATION:   Access Code: [email or web address]  URL: [email or web address]  Date: 07/23/2025  Prepared by: Julita Olivo    Exercises  - Long Sitting Calf Stretch with Strap  - 1 x daily - 7 x weekly - 1 sets - 2-3 reps - 30 second hold  - Long Sitting  Soleus Stretch on Bolster with Strap  - 1 x daily - 7 x weekly - 1 sets - 2-3 reps - 30 second hold  - Active Straight Leg Raise  - 1 x daily - 7 x weekly - 2 sets - 10 reps - 5 second hold  - Seated Calf Raise with Weights on Thighs  - 1 x daily - 7 x weekly - 3 sets - 10 reps  - Seated Figure 4 Ankle Inversion with Resistance  - 1 x daily - 7 x weekly - 2-3 sets - 10 reps    Goals:  Active       PT Problem       Pt will increase L foot inversion to 5/5 to facilitate improved arch support from posterior tibialis.        Start:  06/12/25    Expected End:  07/27/25            Pt will be able to maintain SLS for 30 sec on BLE without pain.        Start:  06/12/25    Expected End:  07/27/25            Pt will increase R glute strength to 5/5 to facilitate improved ability to perform SL activities without R hip pain.       Start:  06/12/25    Expected End:  08/26/25            Pt will increase L calf flexibility to neutral or better to facilitate improved gait mechanics and joint mobility.        Start:  06/12/25    Expected End:  08/26/25

## 2025-07-24 LAB — NONINV COLON CA DNA+OCC BLD SCRN STL QL: NEGATIVE

## 2025-08-11 DIAGNOSIS — J45.20 MILD INTERMITTENT ASTHMA WITHOUT COMPLICATION (HHS-HCC): Primary | ICD-10-CM

## 2025-08-11 RX ORDER — BUDESONIDE AND FORMOTEROL FUMARATE DIHYDRATE 160; 4.5 UG/1; UG/1
2 AEROSOL RESPIRATORY (INHALATION)
Qty: 10.2 G | Refills: 5 | Status: SHIPPED | OUTPATIENT
Start: 2025-08-11 | End: 2026-08-11

## 2025-08-13 ENCOUNTER — TREATMENT (OUTPATIENT)
Dept: PHYSICAL THERAPY | Facility: CLINIC | Age: 60
End: 2025-08-13
Payer: COMMERCIAL

## 2025-08-13 DIAGNOSIS — M25.572 LEFT ANKLE PAIN, UNSPECIFIED CHRONICITY: ICD-10-CM

## 2025-08-13 DIAGNOSIS — M25.551 RIGHT HIP PAIN: ICD-10-CM

## 2025-08-13 PROCEDURE — 97110 THERAPEUTIC EXERCISES: CPT | Mod: GP

## 2025-08-27 ENCOUNTER — TREATMENT (OUTPATIENT)
Dept: PHYSICAL THERAPY | Facility: CLINIC | Age: 60
End: 2025-08-27
Payer: COMMERCIAL

## 2025-08-27 DIAGNOSIS — M25.551 RIGHT HIP PAIN: ICD-10-CM

## 2025-08-27 DIAGNOSIS — M25.572 LEFT ANKLE PAIN, UNSPECIFIED CHRONICITY: ICD-10-CM

## 2025-08-27 PROCEDURE — 97140 MANUAL THERAPY 1/> REGIONS: CPT | Mod: GP

## 2025-08-27 PROCEDURE — 97110 THERAPEUTIC EXERCISES: CPT | Mod: GP

## 2025-09-04 ENCOUNTER — OFFICE VISIT (OUTPATIENT)
Dept: ORTHOPEDIC SURGERY | Facility: HOSPITAL | Age: 60
End: 2025-09-04
Payer: COMMERCIAL

## 2025-09-04 DIAGNOSIS — M76.829 POSTERIOR TIBIAL TENDON DYSFUNCTION: Primary | ICD-10-CM

## 2025-09-04 PROCEDURE — 99212 OFFICE O/P EST SF 10 MIN: CPT | Performed by: ORTHOPAEDIC SURGERY

## 2025-09-04 PROCEDURE — 99212 OFFICE O/P EST SF 10 MIN: CPT

## 2025-09-04 ASSESSMENT — PAIN DESCRIPTION - DESCRIPTORS: DESCRIPTORS: ACHING

## 2025-09-04 ASSESSMENT — PAIN SCALES - GENERAL: PAINLEVEL_OUTOF10: 3

## 2025-09-04 ASSESSMENT — PAIN - FUNCTIONAL ASSESSMENT: PAIN_FUNCTIONAL_ASSESSMENT: 0-10
